# Patient Record
Sex: FEMALE | Race: WHITE | NOT HISPANIC OR LATINO | Employment: FULL TIME | URBAN - METROPOLITAN AREA
[De-identification: names, ages, dates, MRNs, and addresses within clinical notes are randomized per-mention and may not be internally consistent; named-entity substitution may affect disease eponyms.]

---

## 2019-05-01 ENCOUNTER — OFFICE VISIT (OUTPATIENT)
Dept: ENDOCRINOLOGY | Facility: CLINIC | Age: 19
End: 2019-05-01

## 2019-05-01 VITALS
DIASTOLIC BLOOD PRESSURE: 78 MMHG | WEIGHT: 166 LBS | SYSTOLIC BLOOD PRESSURE: 132 MMHG | HEART RATE: 117 BPM | OXYGEN SATURATION: 99 %

## 2019-05-01 DIAGNOSIS — Z30.41 ENCOUNTER FOR SURVEILLANCE OF CONTRACEPTIVE PILLS: ICD-10-CM

## 2019-05-01 DIAGNOSIS — Z46.81 INSULIN PUMP TITRATION: ICD-10-CM

## 2019-05-01 DIAGNOSIS — R00.0 TACHYCARDIA: ICD-10-CM

## 2019-05-01 DIAGNOSIS — E10.65 UNCONTROLLED TYPE 1 DIABETES MELLITUS WITH HYPERGLYCEMIA (HCC): Primary | ICD-10-CM

## 2019-05-01 DIAGNOSIS — E11.649 DIABETIC HYPOGLYCEMIA (HCC): ICD-10-CM

## 2019-05-01 PROBLEM — E10.9 TYPE 1 DIABETES MELLITUS WITHOUT COMPLICATION: Status: ACTIVE | Noted: 2019-05-01

## 2019-05-01 LAB
ALBUMIN SERPL-MCNC: 4 G/DL (ref 3.5–5.2)
ALBUMIN/GLOB SERPL: 1 G/DL
ALP SERPL-CCNC: 95 U/L (ref 39–117)
ALT SERPL W P-5'-P-CCNC: 13 U/L (ref 1–33)
ANION GAP SERPL CALCULATED.3IONS-SCNC: 13.6 MMOL/L
ARTICHOKE IGE QN: 103 MG/DL (ref 0–100)
AST SERPL-CCNC: 37 U/L (ref 1–32)
BASOPHILS # BLD AUTO: 0.07 10*3/MM3 (ref 0–0.2)
BASOPHILS NFR BLD AUTO: 0.7 % (ref 0–1.5)
BILIRUB SERPL-MCNC: 0.5 MG/DL (ref 0.2–1.2)
BUN BLD-MCNC: 11 MG/DL (ref 6–20)
BUN/CREAT SERPL: 13.4 (ref 7–25)
CALCIUM SPEC-SCNC: 9.8 MG/DL (ref 8.6–10.5)
CHLORIDE SERPL-SCNC: 95 MMOL/L (ref 98–107)
CHOLEST SERPL-MCNC: 189 MG/DL (ref 0–200)
CO2 SERPL-SCNC: 27.4 MMOL/L (ref 22–29)
CREAT BLD-MCNC: 0.82 MG/DL (ref 0.57–1)
DEPRECATED RDW RBC AUTO: 56.5 FL (ref 37–54)
EOSINOPHIL # BLD AUTO: 0.39 10*3/MM3 (ref 0–0.4)
EOSINOPHIL NFR BLD AUTO: 4 % (ref 0.3–6.2)
ERYTHROCYTE [DISTWIDTH] IN BLOOD BY AUTOMATED COUNT: 19.8 % (ref 12.3–15.4)
GFR SERPL CREATININE-BSD FRML MDRD: 89 ML/MIN/1.73
GLOBULIN UR ELPH-MCNC: 3.9 GM/DL
GLUCOSE BLD-MCNC: 268 MG/DL (ref 65–99)
GLUCOSE BLDC GLUCOMTR-MCNC: 305 MG/DL (ref 70–130)
HBA1C MFR BLD: 8.2 %
HCT VFR BLD AUTO: 43.2 % (ref 34–46.6)
HDLC SERPL-MCNC: 60 MG/DL (ref 40–60)
HGB BLD-MCNC: 12.8 G/DL (ref 12–15.9)
IMM GRANULOCYTES # BLD AUTO: 0.03 10*3/MM3 (ref 0–0.05)
IMM GRANULOCYTES NFR BLD AUTO: 0.3 % (ref 0–0.5)
LDLC SERPL CALC-MCNC: ABNORMAL MG/DL (ref 0–100)
LDLC/HDLC SERPL: ABNORMAL {RATIO}
LYMPHOCYTES # BLD AUTO: 2.97 10*3/MM3 (ref 0.7–3.1)
LYMPHOCYTES NFR BLD AUTO: 30.3 % (ref 19.6–45.3)
MCH RBC QN AUTO: 23.7 PG (ref 26.6–33)
MCHC RBC AUTO-ENTMCNC: 29.6 G/DL (ref 31.5–35.7)
MCV RBC AUTO: 80.1 FL (ref 79–97)
MONOCYTES # BLD AUTO: 0.59 10*3/MM3 (ref 0.1–0.9)
MONOCYTES NFR BLD AUTO: 6 % (ref 5–12)
NEUTROPHILS # BLD AUTO: 5.75 10*3/MM3 (ref 1.7–7)
NEUTROPHILS NFR BLD AUTO: 58.7 % (ref 42.7–76)
NRBC BLD AUTO-RTO: 0 /100 WBC (ref 0–0.2)
PLATELET # BLD AUTO: 461 10*3/MM3 (ref 140–450)
PMV BLD AUTO: 10.8 FL (ref 6–12)
POTASSIUM BLD-SCNC: 5 MMOL/L (ref 3.5–5.2)
PROT SERPL-MCNC: 7.9 G/DL (ref 6–8.5)
RBC # BLD AUTO: 5.39 10*6/MM3 (ref 3.77–5.28)
SODIUM BLD-SCNC: 136 MMOL/L (ref 136–145)
T4 FREE SERPL-MCNC: 0.85 NG/DL (ref 0.93–1.7)
TRIGL SERPL-MCNC: 494 MG/DL (ref 0–150)
TSH SERPL DL<=0.05 MIU/L-ACNC: 1.54 MIU/ML (ref 0.27–4.2)
VLDLC SERPL-MCNC: ABNORMAL MG/DL (ref 5–40)
WBC NRBC COR # BLD: 9.8 10*3/MM3 (ref 3.4–10.8)

## 2019-05-01 PROCEDURE — 80061 LIPID PANEL: CPT | Performed by: PHYSICIAN ASSISTANT

## 2019-05-01 PROCEDURE — 85025 COMPLETE CBC W/AUTO DIFF WBC: CPT | Performed by: PHYSICIAN ASSISTANT

## 2019-05-01 PROCEDURE — 83721 ASSAY OF BLOOD LIPOPROTEIN: CPT | Performed by: PHYSICIAN ASSISTANT

## 2019-05-01 PROCEDURE — 83036 HEMOGLOBIN GLYCOSYLATED A1C: CPT | Performed by: PHYSICIAN ASSISTANT

## 2019-05-01 PROCEDURE — 80053 COMPREHEN METABOLIC PANEL: CPT | Performed by: PHYSICIAN ASSISTANT

## 2019-05-01 PROCEDURE — 84439 ASSAY OF FREE THYROXINE: CPT | Performed by: PHYSICIAN ASSISTANT

## 2019-05-01 PROCEDURE — 99204 OFFICE O/P NEW MOD 45 MIN: CPT | Performed by: PHYSICIAN ASSISTANT

## 2019-05-01 PROCEDURE — 82043 UR ALBUMIN QUANTITATIVE: CPT | Performed by: PHYSICIAN ASSISTANT

## 2019-05-01 PROCEDURE — 82947 ASSAY GLUCOSE BLOOD QUANT: CPT | Performed by: PHYSICIAN ASSISTANT

## 2019-05-01 PROCEDURE — 84443 ASSAY THYROID STIM HORMONE: CPT | Performed by: PHYSICIAN ASSISTANT

## 2019-05-01 PROCEDURE — 82570 ASSAY OF URINE CREATININE: CPT | Performed by: PHYSICIAN ASSISTANT

## 2019-05-01 NOTE — PROGRESS NOTES
Chief Complaint  Establish care for Diabetes Mellitus.    GIOVANNY   Gina Laws is a 18 y.o. female who is here today for evaluation of Diabetes Mellitus type 1. The initial diagnosis of diabetes was made at age 8.    Self referral. No labs available for review.  Not interested in CGM, doesn't like things attached to her.   Requesting birth control sprintec to be refilled. Hasn't taken in few months, ran out few months ago.  Reports elevated HR in the past year. Drinks caffeine. Hydrates well. Not associated with hypoglycemia.   Dx with Celiac Dz at age 12.   A1C- (5/1/19)    Diabetic complications: none  Eye exam current (within one year): utd in [place] . No retinopathy per pt.   Foot care and dental care: discussed    Current diabetic medications include:  Novolog, medtronic insulin pump    Statin: no    Past medications: lantus    Diabetic Monitoring  -   Pump downloaded and reviewed- checking BS 3-4x/day but only putting high sugar into the pump, hypoglycemia in AM, hyperglycemia in the afternoon, suspending pump when exercising resulting in BS in the 300s    Nutrition:     Current diet: counting carbs  Current exercise: running/ jogging  Seen RD in past: yes    The following portions of the patient's history were reviewed and updated by me as appropriate: allergies, current medications, past family history, past social history, past surgical history and problem list.    History reviewed. No pertinent past medical history.    Medications    Current Outpatient Medications:   •  NOVOLOG 100 UNIT/ML injection, Use up to 100u daily via insulin pump, Disp: 30 mL, Rfl: 6  •  SPRINTEC 28 0.25-35 MG-MCG per tablet, Take 1 tablet by mouth Daily., Disp: 28 tablet, Rfl: 6    Review of Systems  Review of Systems   Constitutional: Negative for chills, fatigue, fever and unexpected weight change.   HENT: Negative for ear pain, hearing loss, nosebleeds, rhinorrhea and sore throat.    Eyes: Negative for pain, discharge,  redness, itching and visual disturbance.   Respiratory: Negative for cough, shortness of breath and wheezing.    Cardiovascular: Negative for chest pain, palpitations and leg swelling.        Elevated HR   Gastrointestinal: Negative for abdominal pain, blood in stool, constipation and diarrhea.   Endocrine: Negative for cold intolerance, heat intolerance and polydipsia.   Genitourinary: Negative for dysuria, frequency, hematuria, pelvic pain, vaginal bleeding and vaginal discharge.   Musculoskeletal: Negative for arthralgias, gait problem, joint swelling and myalgias.   Skin: Negative for rash.   Allergic/Immunologic: Negative.    Neurological: Negative for dizziness, syncope, weakness, numbness and headaches.   Hematological: Negative for adenopathy. Does not bruise/bleed easily.   Psychiatric/Behavioral: Negative for sleep disturbance and suicidal ideas. The patient is not nervous/anxious.         Physical Exam    /78   Pulse 117   Wt 75.3 kg (166 lb)   SpO2 99% There is no height or weight on file to calculate BMI.  Physical Exam   Constitutional: She is oriented to person, place, and time. She appears well-developed. No distress.   HENT:   Head: Normocephalic.   Right Ear: External ear normal.   Left Ear: External ear normal.   Mouth/Throat: No oropharyngeal exudate.   Eyes: Conjunctivae and lids are normal. Right eye exhibits no discharge. Left eye exhibits no discharge. Right pupil is reactive. Left pupil is reactive.   Neck: No JVD present. No tracheal deviation present. No thyroid mass and no thyromegaly present.   Cardiovascular: Normal rate, regular rhythm, normal heart sounds and intact distal pulses.   No murmur heard.  Pulmonary/Chest: Effort normal and breath sounds normal. No respiratory distress. She has no wheezes.   Abdominal: Soft. Bowel sounds are normal. There is no tenderness.   Musculoskeletal: She exhibits no edema or tenderness.    Gina had a diabetic foot exam performed today.    During the foot exam she had a monofilament test performed.    Neurological Sensory Findings -  Unaltered sharp/dull right ankle/foot discrimination and unaltered sharp/dull left ankle/foot discrimination.  Vascular Status -  Her right foot exhibits normal foot vasculature  and no edema. Her left foot exhibits normal foot vasculature  and no edema.  Skin Integrity  -  Her right foot skin is intact.  She has no right foot onychomycosis, no right foot ulcer and non-callous right foot.Her left foot skin is intact. She has no left foot onychomycosis, no left foot ulcer and non-callous left foot..  Lymphadenopathy:     She has no cervical adenopathy.   Neurological: She is alert and oriented to person, place, and time.   Skin: Skin is warm, dry and intact. No rash noted. She is not diaphoretic. No erythema.   Psychiatric: She has a normal mood and affect. Her speech is normal and behavior is normal. Thought content normal.       Labs and Imaging   Lab Results   Component Value Date    HGBA1C 8.2 05/01/2019     Office Visit on 05/01/2019   Component Date Value Ref Range Status   • Glucose 05/01/2019 305* 70 - 130 mg/dL Final   • Hemoglobin A1C 05/01/2019 8.2  % Final     No images are attached to the encounter or orders placed in the encounter.  No Images in the past 120 days found..    Assessment / Plan   Gina was seen today for establish care.    Diagnoses and all orders for this visit:    Uncontrolled type 1 diabetes mellitus with hyperglycemia (CMS/[facility])  -     POC Glucose Fingerstick  -     POC Glycosylated Hemoglobin (Hb A1C)  -     NOVOLOG 100 UNIT/ML injection; Use up to 100u daily via insulin pump  -     Lipid Panel  -     Comprehensive Metabolic Panel  -     Microalbumin / Creatinine Urine Ratio - Urine, Clean Catch    Encounter for surveillance of contraceptive pills  -     SPRINTEC 28 0.25-35 MG-MCG per tablet; Take 1 tablet by mouth Daily.    Tachycardia  -     TSH  -     T4, Free  -     CBC &  Differential  -     CBC Auto Differential    Insulin pump titration    Diabetic hypoglycemia (CMS/HCC)        Diabetes Mellitus 1 is under inadequate control.  -A1c 8.2  -pump downloaded and reviewed- see summary above  -settings adjusted- decreased basal rate 1133-1021, decrease carb ratio and sensitivity starting at 1000, increased target blood sugar from 100 to 120  -discussed keeping pump on while exercising and setting temp basal rate at 50% while exercising  -discussed putting all bs readings in to the pump  -reviewed benefits of CGM, especially basal IQ with use of Dexcom, but pt does not want anything else attached to her    1.  Diet: 3-4 carb servings per meal for females, 4-5 carb servings per meal for males  Spread carb intake throughout the day  Increase lean protein and vegetable intake  Avoid sugary drinks and processed carbs including crackers, cookies, cakes  2.  Exercise: Recommend at least 30 minutes of exercise daily, at least 5 days per week. Increase exercise gradually.   3.  Blood Glucose Goal: Blood glucose goal <150 fasting, <180 2 hr postprandial  4.  Microalbumin due  5.  Education performed during this visit: long term diabetic complications discussed. , annual eye examinations at Ophthalmology discussed, dental hygiene discussed  and foot care reviewed., home glucose monitoring emphasized, all medications, side effects and compliance discussed carefully and Hypoglycemia management and prevention reviewed. Reviewed ‘ABCs’ of diabetes management (respective goals in parentheses):  A1C (<7), blood pressure (<130/80), and cholesterol (LDL <100, if CVD <70).    There are no Patient Instructions on file for this visit.    Follow up: Return in about 3 months (around 8/1/2019).    Discussed the nature of the disease including, risks, complications, implications, management, safe and proper use of medications. Encouraged therapeutic lifestyle changes including low calorie diet with plenty of  fruits and vegetables, daily exercise, medication compliance, and keeping scheduled follow up appointments with me and any other providers. Encouraged patient to have appointment for complete physical, fasting labs, appropriate screenings, and immunizations on an annual basis.    25 min  of 45 min face-to-face visit time spent for coordination of care and counselling regarding identified problems as outlined in the objective, assessment and discussion portions of the documentation.    Tiffanie Rivero PA-C

## 2019-05-02 LAB
ALBUMIN UR-MCNC: 1.8 MG/L
CREAT UR-MCNC: 92.7 MG/DL
MICROALBUMIN/CREAT UR: 19.4 MG/G

## 2019-09-03 ENCOUNTER — OFFICE VISIT (OUTPATIENT)
Dept: ENDOCRINOLOGY | Facility: CLINIC | Age: 19
End: 2019-09-03

## 2019-09-03 VITALS
DIASTOLIC BLOOD PRESSURE: 80 MMHG | WEIGHT: 159 LBS | SYSTOLIC BLOOD PRESSURE: 120 MMHG | OXYGEN SATURATION: 99 % | HEART RATE: 83 BPM

## 2019-09-03 DIAGNOSIS — R79.89 ELEVATED PLATELET COUNT: ICD-10-CM

## 2019-09-03 DIAGNOSIS — E10.9 TYPE 1 DIABETES MELLITUS WITHOUT COMPLICATION (HCC): Primary | ICD-10-CM

## 2019-09-03 DIAGNOSIS — Z46.81 INSULIN PUMP TITRATION: ICD-10-CM

## 2019-09-03 DIAGNOSIS — R94.6 ABNORMAL THYROID FUNCTION TEST: ICD-10-CM

## 2019-09-03 LAB
BASOPHILS # BLD AUTO: 0.09 10*3/MM3 (ref 0–0.2)
BASOPHILS NFR BLD AUTO: 1.3 % (ref 0–1.5)
DEPRECATED RDW RBC AUTO: 53.8 FL (ref 37–54)
EOSINOPHIL # BLD AUTO: 0.15 10*3/MM3 (ref 0–0.4)
EOSINOPHIL NFR BLD AUTO: 2.1 % (ref 0.3–6.2)
ERYTHROCYTE [DISTWIDTH] IN BLOOD BY AUTOMATED COUNT: 17.2 % (ref 12.3–15.4)
GLUCOSE BLDC GLUCOMTR-MCNC: 166 MG/DL (ref 70–130)
HBA1C MFR BLD: 7.7 %
HCT VFR BLD AUTO: 42 % (ref 34–46.6)
HGB BLD-MCNC: 12.4 G/DL (ref 12–15.9)
IMM GRANULOCYTES # BLD AUTO: 0.02 10*3/MM3 (ref 0–0.05)
IMM GRANULOCYTES NFR BLD AUTO: 0.3 % (ref 0–0.5)
LYMPHOCYTES # BLD AUTO: 2.64 10*3/MM3 (ref 0.7–3.1)
LYMPHOCYTES NFR BLD AUTO: 37.6 % (ref 19.6–45.3)
MCH RBC QN AUTO: 25.2 PG (ref 26.6–33)
MCHC RBC AUTO-ENTMCNC: 29.5 G/DL (ref 31.5–35.7)
MCV RBC AUTO: 85.2 FL (ref 79–97)
MONOCYTES # BLD AUTO: 0.44 10*3/MM3 (ref 0.1–0.9)
MONOCYTES NFR BLD AUTO: 6.3 % (ref 5–12)
NEUTROPHILS # BLD AUTO: 3.69 10*3/MM3 (ref 1.7–7)
NEUTROPHILS NFR BLD AUTO: 52.4 % (ref 42.7–76)
NRBC BLD AUTO-RTO: 0 /100 WBC (ref 0–0.2)
PLATELET # BLD AUTO: 339 10*3/MM3 (ref 140–450)
PMV BLD AUTO: 10.6 FL (ref 6–12)
RBC # BLD AUTO: 4.93 10*6/MM3 (ref 3.77–5.28)
T4 FREE SERPL-MCNC: 0.94 NG/DL (ref 0.93–1.7)
TSH SERPL DL<=0.05 MIU/L-ACNC: 1.73 UIU/ML (ref 0.27–4.2)
WBC NRBC COR # BLD: 7.03 10*3/MM3 (ref 3.4–10.8)

## 2019-09-03 PROCEDURE — 84443 ASSAY THYROID STIM HORMONE: CPT | Performed by: PHYSICIAN ASSISTANT

## 2019-09-03 PROCEDURE — 99215 OFFICE O/P EST HI 40 MIN: CPT | Performed by: PHYSICIAN ASSISTANT

## 2019-09-03 PROCEDURE — 83036 HEMOGLOBIN GLYCOSYLATED A1C: CPT | Performed by: PHYSICIAN ASSISTANT

## 2019-09-03 PROCEDURE — 82947 ASSAY GLUCOSE BLOOD QUANT: CPT | Performed by: PHYSICIAN ASSISTANT

## 2019-09-03 PROCEDURE — 85025 COMPLETE CBC W/AUTO DIFF WBC: CPT | Performed by: PHYSICIAN ASSISTANT

## 2019-09-03 PROCEDURE — 84439 ASSAY OF FREE THYROXINE: CPT | Performed by: PHYSICIAN ASSISTANT

## 2019-09-03 RX ORDER — BLOOD-GLUCOSE METER
EACH MISCELLANEOUS
Qty: 1 KIT | Refills: 0 | Status: SHIPPED | OUTPATIENT
Start: 2019-09-03

## 2019-09-03 RX ORDER — BLOOD-GLUCOSE METER
EACH MISCELLANEOUS
Qty: 1 KIT | Refills: 0 | Status: SHIPPED | OUTPATIENT
Start: 2019-09-03 | End: 2019-09-03 | Stop reason: CLARIF

## 2019-09-03 NOTE — PROGRESS NOTES
Chief Complaint  Establish care for Diabetes Mellitus.    GIOVANNY   Gina Laws is a 18 y.o. female who is here today for evaluation of Diabetes Mellitus type 1. The initial diagnosis of diabetes was made at age 8.    Sometimes suspends pump when exercising.   Due to fear of lows adding lower carb counts to pump.     A1C- 7.7 (9/3/19), 8.2 (5/1/19)    Dx with Celiac Dz at age 12.    Diabetic complications: none  Eye exam current (within one year): utd in [place] . No retinopathy per pt.   Foot care and dental care: discussed    Current diabetic medications include:  Novolog, Tandem insulin pump    Statin: no    Past medications: lantus    Diabetic Monitoring  -   meter downloaded and reviewed- checking BS 3-6x/day, large variability in bs with numbers ranging from the 40s to 400s  Unable to download tandem pump due to technical difficulties    Not interested in CGM, doesn't like things attached to her.     Nutrition:     Current diet: counting carbs  Current exercise: running/ jogging  Seen RD in past: yes    The following portions of the patient's history were reviewed and updated by me as appropriate: allergies, current medications, past family history, past social history, past surgical history and problem list.    History reviewed. No pertinent past medical history.    Medications    Current Outpatient Medications:   •  NOVOLOG 100 UNIT/ML injection, Use up to 100u daily via insulin pump, Disp: 30 mL, Rfl: 6  •  SPRINTEC 28 0.25-35 MG-MCG per tablet, Take 1 tablet by mouth Daily., Disp: 28 tablet, Rfl: 6  •  Blood Glucose Monitoring Suppl (ONETOUCH VERIO IQ SYSTEM) w/Device kit, Use as directed, Disp: 1 kit, Rfl: 0  •  glucose blood (ONETOUCH VERIO) test strip, Test bs 4-8x/day, Disp: 750 each, Rfl: 3  •  ONE TOUCH LANCETS misc, Test bs 4-8x/day, Disp: 750 each, Rfl: 3    Review of Systems  Review of Systems   Constitutional: Negative for chills, fatigue, fever and unexpected weight change.   HENT:  Negative for ear pain, hearing loss, nosebleeds, rhinorrhea and sore throat.    Eyes: Negative for pain, discharge, redness, itching and visual disturbance.   Respiratory: Negative for cough, shortness of breath and wheezing.    Cardiovascular: Negative for chest pain, palpitations and leg swelling.        Elevated HR   Gastrointestinal: Negative for abdominal pain, blood in stool, constipation and diarrhea.   Endocrine: Negative for cold intolerance, heat intolerance and polydipsia.   Genitourinary: Negative for dysuria, frequency, hematuria, pelvic pain, vaginal bleeding and vaginal discharge.   Musculoskeletal: Negative for arthralgias, gait problem, joint swelling and myalgias.   Skin: Negative for rash.   Allergic/Immunologic: Negative.    Neurological: Negative for dizziness, syncope, weakness, numbness and headaches.   Hematological: Negative for adenopathy. Does not bruise/bleed easily.   Psychiatric/Behavioral: Negative for sleep disturbance and suicidal ideas. The patient is not nervous/anxious.         Physical Exam    /80   Pulse 83   Wt 72.1 kg (159 lb)   SpO2 99% There is no height or weight on file to calculate BMI.  Physical Exam   Constitutional: She is oriented to person, place, and time. She appears well-developed. No distress.   HENT:   Head: Normocephalic.   Right Ear: External ear normal.   Left Ear: External ear normal.   Mouth/Throat: No oropharyngeal exudate.   Eyes: Conjunctivae and lids are normal. Right eye exhibits no discharge. Left eye exhibits no discharge. Right pupil is reactive. Left pupil is reactive.   Neck: No JVD present. No tracheal deviation present. No thyroid mass and no thyromegaly present.   Cardiovascular: Normal rate, regular rhythm, normal heart sounds and intact distal pulses.   No murmur heard.  Pulmonary/Chest: Effort normal and breath sounds normal. No respiratory distress. She has no wheezes.   Abdominal: Soft. Bowel sounds are normal. There is no  tenderness.   Musculoskeletal: She exhibits no edema or tenderness.     Vascular Status -  Her right foot exhibits no edema. Her left foot exhibits no edema.  Skin Integrity  -  Her right foot skin is intact.  She has no right foot onychomycosis, no right foot ulcer and non-callous right foot.Her left foot skin is intact. She has no left foot onychomycosis, no left foot ulcer and non-callous left foot..  Lymphadenopathy:     She has no cervical adenopathy.   Neurological: She is alert and oriented to person, place, and time.   Skin: Skin is warm, dry and intact. No rash noted. She is not diaphoretic. No erythema.   Psychiatric: She has a normal mood and affect. Her speech is normal and behavior is normal. Thought content normal.       Labs and Imaging   Lab Results   Component Value Date    HGBA1C 8.2 05/01/2019     No visits with results within 1 Month(s) from this visit.   Latest known visit with results is:   Office Visit on 05/01/2019   Component Date Value Ref Range Status   • Glucose 05/01/2019 305* 70 - 130 mg/dL Final   • Hemoglobin A1C 05/01/2019 8.2  % Final   • TSH 05/01/2019 1.540  0.270 - 4.200 mIU/mL Final   • Free T4 05/01/2019 0.85* 0.93 - 1.70 ng/dL Final   • Total Cholesterol 05/01/2019 189  0 - 200 mg/dL Final   • Triglycerides 05/01/2019 494* 0 - 150 mg/dL Final   • HDL Cholesterol 05/01/2019 60  40 - 60 mg/dL Final   • LDL Cholesterol  05/01/2019   0 - 100 mg/dL Final    Unable to calculate   • VLDL Cholesterol 05/01/2019   5 - 40 mg/dL Final    Unable to calculate   • LDL/HDL Ratio 05/01/2019    Final    Unable to calculate   • Glucose 05/01/2019 268* 65 - 99 mg/dL Final   • BUN 05/01/2019 11  6 - 20 mg/dL Final   • Creatinine 05/01/2019 0.82  0.57 - 1.00 mg/dL Final   • Sodium 05/01/2019 136  136 - 145 mmol/L Final   • Potassium 05/01/2019 5.0  3.5 - 5.2 mmol/L Final   • Chloride 05/01/2019 95* 98 - 107 mmol/L Final   • CO2 05/01/2019 27.4  22.0 - 29.0 mmol/L Final   • Calcium 05/01/2019 9.8   8.6 - 10.5 mg/dL Final   • Total Protein 05/01/2019 7.9  6.0 - 8.5 g/dL Final   • Albumin 05/01/2019 4.00  3.50 - 5.20 g/dL Final   • ALT (SGPT) 05/01/2019 13  1 - 33 U/L Final   • AST (SGOT) 05/01/2019 37* 1 - 32 U/L Final   • Alkaline Phosphatase 05/01/2019 95  39 - 117 U/L Final   • Total Bilirubin 05/01/2019 0.5  0.2 - 1.2 mg/dL Final   • eGFR Non African Amer 05/01/2019 89  >60 mL/min/1.73 Final   • Globulin 05/01/2019 3.9  gm/dL Final   • A/G Ratio 05/01/2019 1.0  g/dL Final   • BUN/Creatinine Ratio 05/01/2019 13.4  7.0 - 25.0 Final   • Anion Gap 05/01/2019 13.6  mmol/L Final   • Microalbumin/Creatinine Ratio 05/01/2019 19.4  mg/g Final   • Creatinine, Urine 05/01/2019 92.7  mg/dL Final   • Microalbumin, Urine 05/01/2019 1.8  mg/L Final   • WBC 05/01/2019 9.80  3.40 - 10.80 10*3/mm3 Final   • RBC 05/01/2019 5.39* 3.77 - 5.28 10*6/mm3 Final   • Hemoglobin 05/01/2019 12.8  12.0 - 15.9 g/dL Final   • Hematocrit 05/01/2019 43.2  34.0 - 46.6 % Final   • MCV 05/01/2019 80.1  79.0 - 97.0 fL Final   • MCH 05/01/2019 23.7* 26.6 - 33.0 pg Final   • MCHC 05/01/2019 29.6* 31.5 - 35.7 g/dL Final   • RDW 05/01/2019 19.8* 12.3 - 15.4 % Final   • RDW-SD 05/01/2019 56.5* 37.0 - 54.0 fl Final   • MPV 05/01/2019 10.8  6.0 - 12.0 fL Final   • Platelets 05/01/2019 461* 140 - 450 10*3/mm3 Final   • Neutrophil % 05/01/2019 58.7  42.7 - 76.0 % Final   • Lymphocyte % 05/01/2019 30.3  19.6 - 45.3 % Final   • Monocyte % 05/01/2019 6.0  5.0 - 12.0 % Final   • Eosinophil % 05/01/2019 4.0  0.3 - 6.2 % Final   • Basophil % 05/01/2019 0.7  0.0 - 1.5 % Final   • Immature Grans % 05/01/2019 0.3  0.0 - 0.5 % Final   • Neutrophils, Absolute 05/01/2019 5.75  1.70 - 7.00 10*3/mm3 Final   • Lymphocytes, Absolute 05/01/2019 2.97  0.70 - 3.10 10*3/mm3 Final   • Monocytes, Absolute 05/01/2019 0.59  0.10 - 0.90 10*3/mm3 Final   • Eosinophils, Absolute 05/01/2019 0.39  0.00 - 0.40 10*3/mm3 Final   • Basophils, Absolute 05/01/2019 0.07  0.00 - 0.20  10*3/mm3 Final   • Immature Grans, Absolute 05/01/2019 0.03  0.00 - 0.05 10*3/mm3 Final   • nRBC 05/01/2019 0.0  0.0 - 0.2 /100 WBC Final   • LDL Cholesterol  05/01/2019 103* 0 - 100 mg/dL Final     No images are attached to the encounter or orders placed in the encounter.  No Images in the past 120 days found..    Assessment / Plan   Gina was seen today for follow-up.    Diagnoses and all orders for this visit:    Type 1 diabetes mellitus without complication (CMS/HCC)  -     POC Glucose Fingerstick  -     POC Glycosylated Hemoglobin (Hb A1C)  -     Blood Glucose Monitoring Suppl (ONETOUCH VERIO IQ SYSTEM) w/Device kit; Use as directed  -     glucose blood (ONETOUCH VERIO) test strip; Test bs 4-8x/day  -     ONE TOUCH LANCETS misc; Test bs 4-8x/day    Insulin pump titration    Abnormal thyroid function test  -     TSH  -     T4, Free    Elevated platelet count  -     CBC & Differential  -     CBC Auto Differential        Diabetes Mellitus 1 is under inadequate control.  -A1c 7.7, down from 8.2 5/2019  -unable to download pump due to technical difficulties  -meter downloaded and reviewed- see summary above  -settings adjusted- increase sensitivity at 0600 from 1:20 to 1:25, increased target blood sugar from 120 to 150  -discussed keeping pump on while exercising and setting temp basal rate at 50% while exercising  -discussed putting all bs readings and accurate carbs into the pump    1.  Diet: 3-4 carb servings per meal for females, 4-5 carb servings per meal for males  Spread carb intake throughout the day  Increase lean protein and vegetable intake  Avoid sugary drinks and processed carbs including crackers, cookies, cakes  2.  Exercise: Recommend at least 30 minutes of exercise daily, at least 5 days per week. Increase exercise gradually.   3.  Blood Glucose Goal: Blood glucose goal <150 fasting, <180 2 hr postprandial  4.  Microalbumin due 5/2020  5.  Education performed during this visit: long term diabetic  complications discussed. , annual eye examinations at Ophthalmology discussed, dental hygiene discussed  and foot care reviewed., home glucose monitoring emphasized, all medications, side effects and compliance discussed carefully and Hypoglycemia management and prevention reviewed. Reviewed ‘ABCs’ of diabetes management (respective goals in parentheses):  A1C (<7), blood pressure (<130/80), and cholesterol (LDL <100, if CVD <70).    Abnormal thyroid function test  -normal TSH with slightly low FT4  -repeat today    Elevated platelets  -repeat today    There are no Patient Instructions on file for this visit.    Follow up: Return in about 3 months (around 12/3/2019).    Discussed the nature of the disease including, risks, complications, implications, management, safe and proper use of medications. Encouraged therapeutic lifestyle changes including low calorie diet with plenty of fruits and vegetables, daily exercise, medication compliance, and keeping scheduled follow up appointments with me and any other providers. Encouraged patient to have appointment for complete physical, fasting labs, appropriate screenings, and immunizations on an annual basis.      Tiffanie Rivero PA-C    25 min  of 40 min face-to-face visit time spent for coordination of care and counselling regarding identified problems as outlined in the objective, assessment and discussion portions of the documentation.

## 2019-09-23 ENCOUNTER — TELEPHONE (OUTPATIENT)
Dept: ENDOCRINOLOGY | Facility: CLINIC | Age: 19
End: 2019-09-23

## 2019-09-23 NOTE — TELEPHONE ENCOUNTER
JEANETH IS CALLING FROM [facility] TO SEE IF DEBI RECEIVED PUMP SUPPLIES ORDER THAT THEY RESENT FOR A SIGNATURE ON 9/18/19. [facility] [employer] PHONE [phone]

## 2019-12-09 ENCOUNTER — OFFICE VISIT (OUTPATIENT)
Dept: ENDOCRINOLOGY | Facility: CLINIC | Age: 19
End: 2019-12-09

## 2019-12-09 VITALS
OXYGEN SATURATION: 100 % | DIASTOLIC BLOOD PRESSURE: 80 MMHG | HEART RATE: 89 BPM | WEIGHT: 165 LBS | SYSTOLIC BLOOD PRESSURE: 122 MMHG

## 2019-12-09 DIAGNOSIS — E11.649 DIABETIC HYPOGLYCEMIA (HCC): ICD-10-CM

## 2019-12-09 DIAGNOSIS — E10.65 UNCONTROLLED TYPE 1 DIABETES MELLITUS WITH HYPERGLYCEMIA (HCC): ICD-10-CM

## 2019-12-09 DIAGNOSIS — Z46.81 INSULIN PUMP TITRATION: ICD-10-CM

## 2019-12-09 DIAGNOSIS — E10.65 UNCONTROLLED TYPE 1 DIABETES MELLITUS WITH HYPERGLYCEMIA (HCC): Primary | ICD-10-CM

## 2019-12-09 LAB
GLUCOSE BLDC GLUCOMTR-MCNC: 144 MG/DL (ref 70–130)
HBA1C MFR BLD: 8.1 %

## 2019-12-09 PROCEDURE — 99214 OFFICE O/P EST MOD 30 MIN: CPT | Performed by: PHYSICIAN ASSISTANT

## 2019-12-09 PROCEDURE — 83036 HEMOGLOBIN GLYCOSYLATED A1C: CPT | Performed by: PHYSICIAN ASSISTANT

## 2019-12-09 PROCEDURE — 82947 ASSAY GLUCOSE BLOOD QUANT: CPT | Performed by: PHYSICIAN ASSISTANT

## 2019-12-09 RX ORDER — PROCHLORPERAZINE 25 MG/1
SUPPOSITORY RECTAL
Qty: 3 EACH | Refills: 11 | Status: SHIPPED | OUTPATIENT
Start: 2019-12-09 | End: 2020-06-22 | Stop reason: ALTCHOICE

## 2019-12-09 RX ORDER — PROCHLORPERAZINE 25 MG/1
1 SUPPOSITORY RECTAL
Qty: 1 EACH | Refills: 3 | Status: SHIPPED | OUTPATIENT
Start: 2019-12-09 | End: 2019-12-09 | Stop reason: SDUPTHER

## 2019-12-09 RX ORDER — PROCHLORPERAZINE 25 MG/1
1 SUPPOSITORY RECTAL CONTINUOUS
Qty: 1 DEVICE | Refills: 0 | Status: SHIPPED | OUTPATIENT
Start: 2019-12-09 | End: 2020-06-22 | Stop reason: ALTCHOICE

## 2019-12-09 RX ORDER — PROCHLORPERAZINE 25 MG/1
1 SUPPOSITORY RECTAL
Qty: 1 EACH | Refills: 3 | Status: SHIPPED | OUTPATIENT
Start: 2019-12-09 | End: 2020-06-22 | Stop reason: ALTCHOICE

## 2019-12-09 RX ORDER — PROCHLORPERAZINE 25 MG/1
1 SUPPOSITORY RECTAL CONTINUOUS
Qty: 1 DEVICE | Refills: 0 | Status: SHIPPED | OUTPATIENT
Start: 2019-12-09 | End: 2019-12-09 | Stop reason: SDUPTHER

## 2019-12-09 RX ORDER — PROCHLORPERAZINE 25 MG/1
SUPPOSITORY RECTAL
Qty: 3 EACH | Refills: 11 | Status: SHIPPED | OUTPATIENT
Start: 2019-12-09 | End: 2019-12-09 | Stop reason: SDUPTHER

## 2019-12-09 NOTE — PROGRESS NOTES
Chief Complaint  f/u for Diabetes Mellitus.    HPI   Gina Laws is a 19 y.o. female with celiac dz who is here today for f/u of Diabetes Mellitus type 1. The initial diagnosis of diabetes was made at age 8.     A1C- 8.1 (12/9/19), 7.7 (9/3/19), 8.2 (5/1/19)      Diabetic complications: none  Eye exam current (within one year): utd in [place] . No retinopathy per pt.   Foot care and dental care: discussed    Current diabetic medications include:  Novolog, Tandem insulin pump    Statin: no    Past medications: lantus    Diabetic Monitoring  -   Tandem pump downloaded and reviewed- 65% basal, 32% bolus, not always adding bs readings and carb to pump, frequent manual boluses  Meter downloaded and reviewed- bs randing from 40-400s, checking sugar 3-4x/day    Not interested in CGM, doesn't like things attached to her.     Nutrition:     Current diet: counting carbs  Current exercise: running/ jogging  Seen RD in past: yes    The following portions of the patient's history were reviewed and updated by me as appropriate: allergies, current medications, past family history, past social history, past surgical history and problem list.      History reviewed. No pertinent past medical history.    Medications    Current Outpatient Medications:   •  Blood Glucose Monitoring Suppl (ACCU-CHEK LÁZARO PLUS) w/Device kit, Use device to check blood sugar daily, Disp: 1 kit, Rfl: 0  •  glucose blood (ACCU-CHEK LÁZARO PLUS) test strip, Use as instructed to check blood sugar 4-6 times daily, Disp: 200 each, Rfl: 12  •  NOVOLOG 100 UNIT/ML injection, Use up to 100u daily via insulin pump, Disp: 30 mL, Rfl: 6  •  ONE TOUCH LANCETS misc, Test bs 4-8x/day, Disp: 750 each, Rfl: 3  •  SPRINTEC 28 0.25-35 MG-MCG per tablet, Take 1 tablet by mouth Daily., Disp: 28 tablet, Rfl: 6  •  Continuous Blood Gluc  (DEXCOM G6 ) device, 1 each Continuous., Disp: 1 Device, Rfl: 0  •  Continuous Blood Gluc Sensor (DEXCOM G6 SENSOR),  Every 10 (Ten) Days., Disp: 3 each, Rfl: 11  •  Continuous Blood Gluc Transmit (DEXCOM G6 TRANSMITTER) misc, 1 each Every 3 (Three) Months., Disp: 1 each, Rfl: 3    Review of Systems  Review of Systems   Constitutional: Negative for chills, fatigue, fever and unexpected weight change.   HENT: Negative for ear pain, hearing loss, nosebleeds, rhinorrhea and sore throat.    Eyes: Negative for pain, discharge, redness, itching and visual disturbance.   Respiratory: Negative for cough, shortness of breath and wheezing.    Cardiovascular: Negative for chest pain, palpitations and leg swelling.        Elevated HR   Gastrointestinal: Negative for abdominal pain, blood in stool, constipation and diarrhea.   Endocrine: Negative for cold intolerance, heat intolerance and polydipsia.   Genitourinary: Negative for dysuria, frequency, hematuria, pelvic pain, vaginal bleeding and vaginal discharge.   Musculoskeletal: Negative for arthralgias, gait problem, joint swelling and myalgias.   Skin: Negative for rash.   Allergic/Immunologic: Negative.    Neurological: Negative for dizziness, syncope, weakness, numbness and headaches.   Hematological: Negative for adenopathy. Does not bruise/bleed easily.   Psychiatric/Behavioral: Negative for sleep disturbance and suicidal ideas. The patient is not nervous/anxious.         Physical Exam    /80   Pulse 89   Wt 74.8 kg (165 lb)   SpO2 100% There is no height or weight on file to calculate BMI.  Physical Exam   Constitutional: She is oriented to person, place, and time. She appears well-developed. No distress.   HENT:   Head: Normocephalic.   Right Ear: External ear normal.   Left Ear: External ear normal.   Mouth/Throat: No oropharyngeal exudate.   Eyes: Conjunctivae and lids are normal. Right eye exhibits no discharge. Left eye exhibits no discharge. Right pupil is reactive. Left pupil is reactive.   Neck: No JVD present. No tracheal deviation present. No thyroid mass and no  thyromegaly present.   Cardiovascular: Normal rate, regular rhythm, normal heart sounds and intact distal pulses.   No murmur heard.  Pulmonary/Chest: Effort normal and breath sounds normal. No respiratory distress. She has no wheezes.   Abdominal: Soft. Bowel sounds are normal. There is no tenderness.   Musculoskeletal: She exhibits no edema or tenderness.     Vascular Status -  Her right foot exhibits no edema. Her left foot exhibits no edema.  Skin Integrity  -  Her right foot skin is intact.  She has no right foot onychomycosis, no right foot ulcer and non-callous right foot.Her left foot skin is intact. She has no left foot onychomycosis, no left foot ulcer and non-callous left foot..  Lymphadenopathy:     She has no cervical adenopathy.   Neurological: She is alert and oriented to person, place, and time.   Skin: Skin is warm, dry and intact. No rash noted. She is not diaphoretic. No erythema.   Psychiatric: She has a normal mood and affect. Her speech is normal and behavior is normal. Thought content normal.       Labs and Imaging   Lab Results   Component Value Date    HGBA1C 8.1 12/09/2019    HGBA1C 7.7 09/03/2019    HGBA1C 8.2 05/01/2019     Office Visit on 12/09/2019   Component Date Value Ref Range Status   • Glucose 12/09/2019 144* 70 - 130 mg/dL Final   • Hemoglobin A1C 12/09/2019 8.1  % Final     No images are attached to the encounter or orders placed in the encounter.  No Images in the past 120 days found..    Assessment / Plan   Gina was seen today for follow-up.    Diagnoses and all orders for this visit:    Uncontrolled type 1 diabetes mellitus with hyperglycemia (CMS/[facility])  -     POC Glucose Fingerstick  -     POC Glycosylated Hemoglobin (Hb A1C)  -     Continuous Blood Gluc  (DEXCOM G6 ) device; 1 each Continuous.  -     Continuous Blood Gluc Transmit (DEXCOM G6 TRANSMITTER) misc; 1 each Every 3 (Three) Months.  -     Continuous Blood Gluc Sensor (DEXCOM G6 SENSOR); Every 10  (Ten) Days.    Insulin pump titration    Diabetic hypoglycemia (CMS/[facility])        Diabetes Mellitus 1 is under inadequate control.  -A1c 7.7, down from 8.2 5/2019  -large variability in bs with frequent hypoglycemia   -pump/meter downloaded and reviewed- see summary above  -pt needs to put all bs readings and carbs into pump  -settings adjusted- slightly decrease basal rate in the evening  -pt agrees to CGM, will send rx for dexcom as this will help with overall bs control. She has a t flex pump.     1.  Diet: 3-4 carb servings per meal for females, 4-5 carb servings per meal for males  Spread carb intake throughout the day  Increase lean protein and vegetable intake  Avoid sugary drinks and processed carbs including crackers, cookies, cakes  2.  Exercise: Recommend at least 30 minutes of exercise daily, at least 5 days per week. Increase exercise gradually.   3.  Blood Glucose Goal: Blood glucose goal <150 fasting, <180 2 hr postprandial  4.  Microalbumin due 5/2020  5.  Education performed during this visit: long term diabetic complications discussed. , annual eye examinations at Ophthalmology discussed, dental hygiene discussed  and foot care reviewed., home glucose monitoring emphasized, all medications, side effects and compliance discussed carefully and Hypoglycemia management and prevention reviewed. Reviewed ‘ABCs’ of diabetes management (respective goals in parentheses):  A1C (<7), blood pressure (<130/80), and cholesterol (LDL <100, if CVD <70).    There are no Patient Instructions on file for this visit.    Follow up: Return in about 3 months (around 3/9/2020).    Discussed the nature of the disease including, risks, complications, implications, management, safe and proper use of medications. Encouraged therapeutic lifestyle changes including low calorie diet with plenty of fruits and vegetables, daily exercise, medication compliance, and keeping scheduled follow up appointments with me and any other  providers. Encouraged patient to have appointment for complete physical, fasting labs, appropriate screenings, and immunizations on an annual basis.      Tiffanie Rivero PA-C

## 2019-12-11 ENCOUNTER — TELEPHONE (OUTPATIENT)
Dept: INTERNAL MEDICINE | Facility: CLINIC | Age: 19
End: 2019-12-11

## 2019-12-11 NOTE — TELEPHONE ENCOUNTER
Please call [facility] his insurance does not cover the dexcom  It will cover something like the accu chek    Please call [phone]

## 2020-01-03 ENCOUNTER — TELEPHONE (OUTPATIENT)
Dept: INTERNAL MEDICINE | Facility: CLINIC | Age: 20
End: 2020-01-03

## 2020-01-03 NOTE — TELEPHONE ENCOUNTER
Pt informed that she needed to contact the [facility] pharmacy to give them consent to send her information to Dexcom because her Dexcom will have to be billed through DME. Phone number was given to her.

## 2020-01-27 DIAGNOSIS — E10.65 UNCONTROLLED TYPE 1 DIABETES MELLITUS WITH HYPERGLYCEMIA (HCC): Primary | ICD-10-CM

## 2020-06-22 ENCOUNTER — OFFICE VISIT (OUTPATIENT)
Dept: ENDOCRINOLOGY | Facility: CLINIC | Age: 20
End: 2020-06-22

## 2020-06-22 ENCOUNTER — LAB (OUTPATIENT)
Dept: LAB | Facility: HOSPITAL | Age: 20
End: 2020-06-22

## 2020-06-22 VITALS
DIASTOLIC BLOOD PRESSURE: 74 MMHG | SYSTOLIC BLOOD PRESSURE: 110 MMHG | OXYGEN SATURATION: 99 % | HEART RATE: 77 BPM | WEIGHT: 164.6 LBS

## 2020-06-22 DIAGNOSIS — E11.649 DIABETIC HYPOGLYCEMIA (HCC): ICD-10-CM

## 2020-06-22 DIAGNOSIS — Z46.81 INSULIN PUMP TITRATION: ICD-10-CM

## 2020-06-22 DIAGNOSIS — E10.65 UNCONTROLLED TYPE 1 DIABETES MELLITUS WITH HYPERGLYCEMIA (HCC): Primary | ICD-10-CM

## 2020-06-22 LAB
GLUCOSE BLDC GLUCOMTR-MCNC: 208 MG/DL (ref 70–130)
HBA1C MFR BLD: 8.2 %

## 2020-06-22 PROCEDURE — 82947 ASSAY GLUCOSE BLOOD QUANT: CPT | Performed by: PHYSICIAN ASSISTANT

## 2020-06-22 PROCEDURE — 99214 OFFICE O/P EST MOD 30 MIN: CPT | Performed by: PHYSICIAN ASSISTANT

## 2020-06-22 PROCEDURE — 80061 LIPID PANEL: CPT | Performed by: PHYSICIAN ASSISTANT

## 2020-06-22 PROCEDURE — 84443 ASSAY THYROID STIM HORMONE: CPT | Performed by: PHYSICIAN ASSISTANT

## 2020-06-22 PROCEDURE — 80053 COMPREHEN METABOLIC PANEL: CPT | Performed by: PHYSICIAN ASSISTANT

## 2020-06-22 PROCEDURE — 82043 UR ALBUMIN QUANTITATIVE: CPT | Performed by: PHYSICIAN ASSISTANT

## 2020-06-22 PROCEDURE — 83036 HEMOGLOBIN GLYCOSYLATED A1C: CPT | Performed by: PHYSICIAN ASSISTANT

## 2020-06-22 PROCEDURE — 82570 ASSAY OF URINE CREATININE: CPT | Performed by: PHYSICIAN ASSISTANT

## 2020-06-22 NOTE — PROGRESS NOTES
Chief Complaint  f/u for Diabetes Mellitus.    HPI   Gina Laws is a 19 y.o. female with celiac dz who is here today for f/u of Diabetes Mellitus type 1. The initial diagnosis of diabetes was made at age 8.    Pt reports she did not follow through to get Dexcom but is willing to try again. Reports her mom would really like her to have it.      A1C- 8.2 (6/22/2020), 8.1 (12/9/19), 7.7 (9/3/19), 8.2 (5/1/19)      Diabetic complications: none  Eye exam current (within one year): utd in [place] . No retinopathy per pt.   Foot care and dental care: discussed    Current diabetic medications include:  Novolog, Tandem insulin pump    Statin: no    Past medications: lantus    Diabetic Monitoring  -   Tandem pump downloaded and reviewed- 63% basal, 31% bolus, 6% correction bolus, checking bs 1-2x/day, mostly manual boluses, rarely enters carbs into pump  Meter downloaded and reviewed- bs ranging from 40-300s, checking sugar 1-4x/day    Dexcom was not covered by insurance.      Nutrition:     Current diet: counting carbs  Current exercise: running/ jogging  Seen RD in past: yes    The following portions of the patient's history were reviewed and updated by me as appropriate: allergies, current medications, past family history, past social history, past surgical history and problem list.      History reviewed. No pertinent past medical history.    Medications    Current Outpatient Medications:   •  Blood Glucose Monitoring Suppl (ACCU-CHEK LÁZARO PLUS) w/Device kit, Use device to check blood sugar daily, Disp: 1 kit, Rfl: 0  •  glucose blood (ACCU-CHEK LÁZARO PLUS) test strip, Use as instructed to check blood sugar 4-6 times daily, Disp: 200 each, Rfl: 12  •  insulin lispro (HumaLOG) 100 UNIT/ML injection, Take up to 100u daily via insulin pump as directed, Disp: 30 mL, Rfl: 6  •  ONE TOUCH LANCETS misc, Test bs 4-8x/day, Disp: 750 each, Rfl: 3    Review of Systems  Review of Systems   Constitutional: Negative for  chills, fatigue, fever and unexpected weight change.   HENT: Negative for ear pain, hearing loss, nosebleeds, rhinorrhea and sore throat.    Eyes: Negative for pain, discharge, redness, itching and visual disturbance.   Respiratory: Negative for cough, shortness of breath and wheezing.    Cardiovascular: Negative for chest pain, palpitations and leg swelling.        Elevated HR   Gastrointestinal: Negative for abdominal pain, blood in stool, constipation and diarrhea.   Endocrine: Negative for cold intolerance, heat intolerance and polydipsia.   Genitourinary: Negative for dysuria, frequency, hematuria, pelvic pain, vaginal bleeding and vaginal discharge.   Musculoskeletal: Negative for arthralgias, gait problem, joint swelling and myalgias.   Skin: Negative for rash.   Allergic/Immunologic: Negative.    Neurological: Negative for dizziness, syncope, weakness, numbness and headaches.   Hematological: Negative for adenopathy. Does not bruise/bleed easily.   Psychiatric/Behavioral: Negative for sleep disturbance and suicidal ideas. The patient is not nervous/anxious.         Physical Exam    /74   Pulse 77   Wt 74.7 kg (164 lb 9.6 oz)   SpO2 99% There is no height or weight on file to calculate BMI.  Physical Exam   Constitutional: She is oriented to person, place, and time. She appears well-developed. No distress.   HENT:   Head: Normocephalic.   Right Ear: External ear normal.   Left Ear: External ear normal.   Mouth/Throat: No oropharyngeal exudate.   Eyes: Conjunctivae and lids are normal. Right eye exhibits no discharge. Left eye exhibits no discharge. Right pupil is reactive. Left pupil is reactive.   Neck: No JVD present. No tracheal deviation present. No thyroid mass and no thyromegaly present.   Cardiovascular: Normal rate, regular rhythm, normal heart sounds and intact distal pulses.   No murmur heard.  Pulmonary/Chest: Effort normal and breath sounds normal. No respiratory distress. She has no  wheezes.   Abdominal: Soft. Bowel sounds are normal. There is no tenderness.   Musculoskeletal: She exhibits no edema or tenderness.    Gina had a diabetic foot exam performed today.   During the foot exam she had a monofilament test performed.    Neurological Sensory Findings - Unaltered hot/cold right ankle/foot discrimination and unaltered hot/cold left ankle/foot discrimination. Unaltered sharp/dull right ankle/foot discrimination and unaltered sharp/dull left ankle/foot discrimination. No right ankle/foot altered proprioception and no left ankle/foot altered proprioception  Vascular Status -  Her right foot exhibits normal foot vasculature  and no edema. Her left foot exhibits normal foot vasculature  and no edema.  Skin Integrity  -  Her right foot skin is intact.  She has no right foot onychomycosis, no right foot ulcer and non-callous right foot.Her left foot skin is intact. She has no left foot onychomycosis, no left foot ulcer and non-callous left foot..  Lymphadenopathy:     She has no cervical adenopathy.   Neurological: She is alert and oriented to person, place, and time.   Skin: Skin is warm, dry and intact. No rash noted. She is not diaphoretic. No erythema.   Psychiatric: She has a normal mood and affect. Her speech is normal and behavior is normal. Thought content normal.       Labs and Imaging   Lab Results   Component Value Date    HGBA1C 8.2 06/22/2020    HGBA1C 8.1 12/09/2019    HGBA1C 7.7 09/03/2019     Office Visit on 06/22/2020   Component Date Value Ref Range Status   • Glucose 06/22/2020 208* 70 - 130 mg/dL Final   • Hemoglobin A1C 06/22/2020 8.2  % Final     No images are attached to the encounter or orders placed in the encounter.  No Images in the past 120 days found..    Assessment / Plan   Gina was seen today for follow-up.    Diagnoses and all orders for this visit:    Uncontrolled type 1 diabetes mellitus with hyperglycemia (CMS/[facility])  -     POC Glucose Fingerstick  -     POC  Glycosylated Hemoglobin (Hb A1C)  -     insulin lispro (HumaLOG) 100 UNIT/ML injection; Take up to 100u daily via insulin pump as directed  -     Microalbumin / Creatinine Urine Ratio - Urine, Random Void  -     Comprehensive Metabolic Panel  -     Lipid Panel  -     TSH    Insulin pump titration    Diabetic hypoglycemia (CMS/HCC)        Diabetes Mellitus 1 is under inadequate control.  -A1c 8.2, mostly due to inadequate bs checks and not entering carbs into pump. She mainly uses manual bolus  -she does have a very aggressive carb ratio in the evening that I have changed to be similar to other carb ratios  -we discussed in order to get bs under control will need to check sugar and count carbs and enter both into the pump every time she consumes carbs. This will provide information on whether settings need to be changed  -will try to send dexcom again and see if covered  -contacted [employer] to see if she is due for pump upgrade    1.  Diet: 3-4 carb servings per meal for females, 4-5 carb servings per meal for males  Spread carb intake throughout the day  Increase lean protein and vegetable intake  Avoid sugary drinks and processed carbs including crackers, cookies, cakes  2.  Exercise: Recommend at least 30 minutes of exercise daily, at least 5 days per week. Increase exercise gradually.   3.  Blood Glucose Goal: Blood glucose goal <150 fasting, <180 2 hr postprandial  4.  Microalbumin due 5/2020  5.  Education performed during this visit: long term diabetic complications discussed. , annual eye examinations at Ophthalmology discussed, dental hygiene discussed  and foot care reviewed., home glucose monitoring emphasized, all medications, side effects and compliance discussed carefully and Hypoglycemia management and prevention reviewed. Reviewed ‘ABCs’ of diabetes management (respective goals in parentheses):  A1C (<7), blood pressure (<130/80), and cholesterol (LDL <100, if CVD <70).    There are no Patient  Instructions on file for this visit.    Follow up: Return in about 3 months (around 9/22/2020).    Discussed the nature of the disease including, risks, complications, implications, management, safe and proper use of medications. Encouraged therapeutic lifestyle changes including low calorie diet with plenty of fruits and vegetables, daily exercise, medication compliance, and keeping scheduled follow up appointments with me and any other providers. Encouraged patient to have appointment for complete physical, fasting labs, appropriate screenings, and immunizations on an annual basis.      Tiffanie Rivero PA-C    Addendum 8/25/2020- pt is testing bs 4x/day

## 2020-06-23 LAB
ALBUMIN SERPL-MCNC: 4.2 G/DL (ref 3.5–5.2)
ALBUMIN UR-MCNC: 1.4 MG/DL
ALBUMIN/GLOB SERPL: 1.5 G/DL
ALP SERPL-CCNC: 77 U/L (ref 39–117)
ALT SERPL W P-5'-P-CCNC: 13 U/L (ref 1–33)
ANION GAP SERPL CALCULATED.3IONS-SCNC: 8.1 MMOL/L (ref 5–15)
AST SERPL-CCNC: 14 U/L (ref 1–32)
BILIRUB SERPL-MCNC: 0.9 MG/DL (ref 0.2–1.2)
BUN BLD-MCNC: 12 MG/DL (ref 6–20)
BUN/CREAT SERPL: 17.6 (ref 7–25)
CALCIUM SPEC-SCNC: 9.3 MG/DL (ref 8.6–10.5)
CHLORIDE SERPL-SCNC: 101 MMOL/L (ref 98–107)
CHOLEST SERPL-MCNC: 154 MG/DL (ref 0–200)
CO2 SERPL-SCNC: 29.9 MMOL/L (ref 22–29)
CREAT BLD-MCNC: 0.68 MG/DL (ref 0.57–1)
CREAT UR-MCNC: 148.8 MG/DL
GFR SERPL CREATININE-BSD FRML MDRD: 109 ML/MIN/1.73
GLOBULIN UR ELPH-MCNC: 2.8 GM/DL
GLUCOSE BLD-MCNC: 168 MG/DL (ref 65–99)
HDLC SERPL-MCNC: 53 MG/DL (ref 40–60)
LDLC SERPL CALC-MCNC: 91 MG/DL (ref 0–100)
LDLC/HDLC SERPL: 1.72 {RATIO}
MICROALBUMIN/CREAT UR: 9.4 MG/G
POTASSIUM BLD-SCNC: 4.3 MMOL/L (ref 3.5–5.2)
PROT SERPL-MCNC: 7 G/DL (ref 6–8.5)
SODIUM BLD-SCNC: 139 MMOL/L (ref 136–145)
TRIGL SERPL-MCNC: 50 MG/DL (ref 0–150)
TSH SERPL DL<=0.05 MIU/L-ACNC: 1.15 UIU/ML (ref 0.27–4.2)
VLDLC SERPL-MCNC: 10 MG/DL (ref 5–40)

## 2020-06-23 RX ORDER — PROCHLORPERAZINE 25 MG/1
1 SUPPOSITORY RECTAL
Qty: 1 EACH | Refills: 3 | Status: SHIPPED | OUTPATIENT
Start: 2020-06-23

## 2020-06-23 RX ORDER — PROCHLORPERAZINE 25 MG/1
SUPPOSITORY RECTAL
Qty: 3 EACH | Refills: 12 | Status: SHIPPED | OUTPATIENT
Start: 2020-06-23

## 2020-06-23 RX ORDER — PROCHLORPERAZINE 25 MG/1
1 SUPPOSITORY RECTAL CONTINUOUS
Qty: 1 DEVICE | Refills: 0 | Status: SHIPPED | OUTPATIENT
Start: 2020-06-23

## 2020-07-07 ENCOUNTER — TELEPHONE (OUTPATIENT)
Dept: ENDOCRINOLOGY | Facility: CLINIC | Age: 20
End: 2020-07-07

## 2020-07-07 NOTE — TELEPHONE ENCOUNTER
Left a message explaining that Cameron from [employer] was having a hard time finding her in their system. He is aware that she got supplies from [facility] but they said that was only ome time. I asked her to call me and let me know who originally shipped her the [employer] pump so that I could get that info to Cameron.

## 2020-08-14 ENCOUNTER — TELEPHONE (OUTPATIENT)
Dept: ENDOCRINOLOGY | Facility: CLINIC | Age: 20
End: 2020-08-14

## 2020-08-14 NOTE — TELEPHONE ENCOUNTER
MOTHER CALLED TO REQUEST THAT THE PATIENT'S OFFICE NOTES NEED TO REFLECT THAT THE PATIENT IS TESTING A MINIMUM OF 4 TIMES DAILY. PLEASE PROVIDE UPDATED INFORMATION TO [facility].

## 2020-08-20 NOTE — TELEPHONE ENCOUNTER
PT's voice,ail was full, LM on mom's voicemail telling her that we would be happy to change the chart notes if she wanted to download her pump/meter so that we would have a report reflecting that she was truly testing 4x daily. I asked her to call with questions.

## 2020-08-25 NOTE — TELEPHONE ENCOUNTER
We received a copy of her blood sugar log so Tiffanie addend the chart note, copy mailed to patient.

## 2020-08-27 ENCOUNTER — TELEPHONE (OUTPATIENT)
Dept: ENDOCRINOLOGY | Facility: CLINIC | Age: 20
End: 2020-08-27

## 2020-08-27 NOTE — TELEPHONE ENCOUNTER
[facility] CALLED TO SEE IF WE RECEIVED THE FAX FOR TESTING 4 TIMES A DAY    PLEASE CALL THEM BACK

## 2020-08-28 NOTE — TELEPHONE ENCOUNTER
PATIENTS MOTHER IS WANTING US TO FAX WHAT [facility] NEEDS TO A FAX NUMBER SHE HAS SO SHE CAN E-MAIL THE INFORMATION TO [facility]. PATIENTS ADDRESS HAD CHANGED SO ITS UPDATED TODAY. FAX NUMBER [phone] THIS IS MOTHERS FAX NUMBER OK TO FAX INFORMATION

## 2020-08-28 NOTE — TELEPHONE ENCOUNTER
Left message on pt's mother's voicemail letting her know that we did receive it, office note was updated and a copy was mailed to Gina. I advised her to call with questions

## 2020-12-04 ENCOUNTER — TELEPHONE (OUTPATIENT)
Dept: ENDOCRINOLOGY | Facility: CLINIC | Age: 20
End: 2020-12-04

## 2020-12-04 NOTE — TELEPHONE ENCOUNTER
Spoke with Mom, she stated that patient has been having a lot of lows and had one last night and she could not wake her up.  She feels her pump needs adjusting.  Patient appt is not until 1/5/21  Please advise. I have printed off her dexcom report.  She would like to know something before going into the weekend.

## 2020-12-04 NOTE — TELEPHONE ENCOUNTER
PLEASE CALL SHE IS HAVING SOME LOW BLOOD SUGARS THIS AM IT WAS 40 AND IT TOOK AWHILE TO GET IT BACK UP    PLEASE CALL [phone]

## 2020-12-04 NOTE — TELEPHONE ENCOUNTER
Spoke with Mom advised about the message, expressed understanding and they will call back if anything changes or need more adjusting

## 2021-01-05 ENCOUNTER — OFFICE VISIT (OUTPATIENT)
Dept: ENDOCRINOLOGY | Facility: CLINIC | Age: 21
End: 2021-01-05

## 2021-01-05 VITALS
TEMPERATURE: 96.9 F | OXYGEN SATURATION: 99 % | DIASTOLIC BLOOD PRESSURE: 82 MMHG | WEIGHT: 166.2 LBS | HEART RATE: 76 BPM | SYSTOLIC BLOOD PRESSURE: 122 MMHG

## 2021-01-05 DIAGNOSIS — E10.9 TYPE 1 DIABETES MELLITUS WITHOUT COMPLICATION (HCC): Primary | Chronic | ICD-10-CM

## 2021-01-05 LAB
GLUCOSE BLDC GLUCOMTR-MCNC: 125 MG/DL (ref 70–130)
HBA1C MFR BLD: 7.2 %

## 2021-01-05 PROCEDURE — 83036 HEMOGLOBIN GLYCOSYLATED A1C: CPT | Performed by: PHYSICIAN ASSISTANT

## 2021-01-05 PROCEDURE — 95251 CONT GLUC MNTR ANALYSIS I&R: CPT | Performed by: PHYSICIAN ASSISTANT

## 2021-01-05 PROCEDURE — 99215 OFFICE O/P EST HI 40 MIN: CPT | Performed by: PHYSICIAN ASSISTANT

## 2021-01-05 NOTE — PROGRESS NOTES
Chief Complaint  f/u for Diabetes Mellitus.    HPI   Gina Laws is a 20 y.o. female with celiac dz who is here today for f/u of Diabetes Mellitus type 1. The initial diagnosis of diabetes was made at age 8.    Has started using Dexcom though sensor stopped working last night.   Has done somewhat better at putting carbs into pump.  She snacks during the day, doesn't bolus due to fear of hypoglycemia. Rides horses during the day.   Has fear of night time hypoglycemia.     A1C- 7.2 (1/5/2021), 8.2 (6/22/2020), 8.1 (12/9/19), 7.7 (9/3/19), 8.2 (5/1/19)      Diabetic complications: none  Eye exam current (within one year): utd in [place] . No retinopathy per pt.   Foot care and dental care: discussed    Current diabetic medications include:  Novolog, Tandem insulin pump    Statin: no    Past medications: lantus    Diabetic Monitoring  -   Tandem pump/dexcom G6 downloaded and reviewed- 63% basal, 36% bolus, 1% correction bolus, large variability in BG from 40s to 400s, mostly relying on manual bolus though has done a bit better with adding BG and cabs to pump      Dexcom was not covered by insurance.      Nutrition:     Current diet: counting carbs  Current exercise: running/ jogging  Seen RD in past: yes    The following portions of the patient's history were reviewed and updated by me as appropriate: allergies, current medications, past family history, past social history, past surgical history and problem list.        History reviewed. No pertinent past medical history.    Medications    Current Outpatient Medications:   •  Blood Glucose Monitoring Suppl (ACCU-CHEK LÁZARO PLUS) w/Device kit, Use device to check blood sugar daily, Disp: 1 kit, Rfl: 0  •  Continuous Blood Gluc  (DEXCOM G6 ) device, 1 Device Continuous., Disp: 1 Device, Rfl: 0  •  Continuous Blood Gluc Sensor (DEXCOM G6 SENSOR), Every 10 (Ten) Days., Disp: 3 each, Rfl: 12  •  Continuous Blood Gluc Transmit (DEXCOM G6 TRANSMITTER)  misc, 1 each Every 3 (Three) Months., Disp: 1 each, Rfl: 3  •  glucose blood (ACCU-CHEK LÁZARO PLUS) test strip, Use as instructed to check blood sugar 4-6 times daily, Disp: 200 each, Rfl: 12  •  insulin lispro (HumaLOG) 100 UNIT/ML injection, Take up to 100u daily via insulin pump as directed, Disp: 90 mL, Rfl: 1  •  ONE TOUCH LANCETS misc, Test bs 4-8x/day, Disp: 750 each, Rfl: 3    Review of Systems  Review of Systems   Constitutional: Negative for chills, fatigue, fever and unexpected weight change.   HENT: Negative for ear pain, hearing loss, nosebleeds, rhinorrhea and sore throat.    Eyes: Negative for pain, discharge, redness, itching and visual disturbance.   Respiratory: Negative for cough, shortness of breath and wheezing.    Cardiovascular: Negative for chest pain, palpitations and leg swelling.        Elevated HR   Gastrointestinal: Negative for abdominal pain, blood in stool, constipation and diarrhea.   Endocrine: Negative for cold intolerance, heat intolerance and polydipsia.   Genitourinary: Negative for dysuria, frequency, hematuria, pelvic pain, vaginal bleeding and vaginal discharge.   Musculoskeletal: Negative for arthralgias, gait problem, joint swelling and myalgias.   Skin: Negative for rash.   Allergic/Immunologic: Negative.    Neurological: Negative for dizziness, syncope, weakness, numbness and headaches.   Hematological: Negative for adenopathy. Does not bruise/bleed easily.   Psychiatric/Behavioral: Negative for sleep disturbance and suicidal ideas. The patient is not nervous/anxious.         Physical Exam    /82   Pulse 76   Temp 96.9 °F (36.1 °C)   Wt 75.4 kg (166 lb 3.2 oz)   SpO2 99% There is no height or weight on file to calculate BMI.  Physical Exam   Constitutional: She is oriented to person, place, and time. She appears well-developed. No distress.   HENT:   Head: Normocephalic.   Right Ear: External ear normal.   Left Ear: External ear normal.   Mouth/Throat: No  oropharyngeal exudate.   Eyes: Conjunctivae and lids are normal. Right eye exhibits no discharge. Left eye exhibits no discharge. Right pupil is reactive. Left pupil is reactive.   Neck: No JVD present. No tracheal deviation present. No thyroid mass and no thyromegaly present.   Cardiovascular: Normal rate, regular rhythm and normal heart sounds.   No murmur heard.  Pulmonary/Chest: Effort normal and breath sounds normal. No respiratory distress. She has no wheezes.   Abdominal: Soft. Bowel sounds are normal. There is no abdominal tenderness.   Musculoskeletal: No tenderness.   Lymphadenopathy:     She has no cervical adenopathy.   Neurological: She is alert and oriented to person, place, and time.   Skin: Skin is warm and dry. No rash noted. She is not diaphoretic. No erythema.   Psychiatric: Her speech is normal and behavior is normal. Thought content normal.       Labs and Imaging   Lab Results   Component Value Date    HGBA1C 7.2 01/05/2021    HGBA1C 8.2 06/22/2020    HGBA1C 8.1 12/09/2019     Office Visit on 01/05/2021   Component Date Value Ref Range Status   • Glucose 01/05/2021 125  70 - 130 mg/dL Final   • Hemoglobin A1C 01/05/2021 7.2  % Final     No images are attached to the encounter or orders placed in the encounter.  No Images in the past 120 days found..    Assessment / Plan   Diagnoses and all orders for this visit:    1. Type 1 diabetes mellitus without complication (CMS/[facility]) (Primary)  -     insulin lispro (HumaLOG) 100 UNIT/ML injection; Take up to 100u daily via insulin pump as directed  Dispense: 90 mL; Refill: 1  -     POC Glucose, Blood  -     POC Glycosylated Hemoglobin (Hb A1C)        Diabetes Mellitus 1 is under inadequate control.  -A1c 7.2, down from 8.2 last visit  -tandem pump/dexcom downloaded and reviewed- large variability from 40s to 400s, mostly relying on manual boluses with occasional BG/carb entries  -has day time and night time hypoglycemia  -fear of night time  hypoglycemia  -decrease basal rate over all, weaken carb ratio and correction with emphasis on putting all BG readings and carbs into pump before eating (snacks and carbs)  -if she continues to have persistent hypoglycemia <70 and hyperglycemia >200 to let me know for further setting adjustments  -current pump is in warranty until 5/2022. Will be eligible for control IQ at that time  -diabetes maintenance labs are utd    1.  Diet: 3-4 carb servings per meal for females, 4-5 carb servings per meal for males  Spread carb intake throughout the day  Increase lean protein and vegetable intake  Avoid sugary drinks and processed carbs including crackers, cookies, cakes  2.  Exercise: Recommend at least 30 minutes of exercise daily, at least 5 days per week. Increase exercise gradually.   3.  Blood Glucose Goal: Blood glucose goal <150 fasting, <180 2 hr postprandial  4.  Microalbumin due 6/2021  5.  Education performed during this visit: long term diabetic complications discussed. , annual eye examinations at Ophthalmology discussed, dental hygiene discussed  and foot care reviewed., home glucose monitoring emphasized, all medications, side effects and compliance discussed carefully and Hypoglycemia management and prevention reviewed. Reviewed ‘ABCs’ of diabetes management (respective goals in parentheses):  A1C (<7), blood pressure (<130/80), and cholesterol (LDL <100, if CVD <70).    There are no Patient Instructions on file for this visit.    Follow up: Return in about 3 months (around 4/5/2021).    Discussed the nature of the disease including, risks, complications, implications, management, safe and proper use of medications. Encouraged therapeutic lifestyle changes including low calorie diet with plenty of fruits and vegetables, daily exercise, medication compliance, and keeping scheduled follow up appointments with me and any other providers. Encouraged patient to have appointment for complete physical, fasting  labs, appropriate screenings, and immunizations on an annual basis.    40 minutes were spent for chart review, charting, review of labs/tests, coordination of care and counselling regarding identified problems as outlined in the objective, assessment and discussion portions of the documentation.      Tiffanie Rivero PA-C

## 2021-07-23 ENCOUNTER — OFFICE VISIT (OUTPATIENT)
Dept: ENDOCRINOLOGY | Facility: CLINIC | Age: 21
End: 2021-07-23

## 2021-07-23 ENCOUNTER — LAB (OUTPATIENT)
Dept: LAB | Facility: HOSPITAL | Age: 21
End: 2021-07-23

## 2021-07-23 VITALS
BODY MASS INDEX: 26.48 KG/M2 | OXYGEN SATURATION: 98 % | HEART RATE: 77 BPM | WEIGHT: 164.8 LBS | SYSTOLIC BLOOD PRESSURE: 130 MMHG | HEIGHT: 66 IN | DIASTOLIC BLOOD PRESSURE: 80 MMHG

## 2021-07-23 DIAGNOSIS — E10.65 UNCONTROLLED TYPE 1 DIABETES MELLITUS WITH HYPERGLYCEMIA (HCC): Primary | Chronic | ICD-10-CM

## 2021-07-23 DIAGNOSIS — E11.649 DIABETIC HYPOGLYCEMIA (HCC): Chronic | ICD-10-CM

## 2021-07-23 LAB
ALBUMIN SERPL-MCNC: 4.3 G/DL (ref 3.5–5.2)
ALBUMIN/GLOB SERPL: 1.3 G/DL
ALP SERPL-CCNC: 70 U/L (ref 39–117)
ALT SERPL W P-5'-P-CCNC: 16 U/L (ref 1–33)
ANION GAP SERPL CALCULATED.3IONS-SCNC: 8 MMOL/L (ref 5–15)
AST SERPL-CCNC: 18 U/L (ref 1–32)
BILIRUB SERPL-MCNC: 0.5 MG/DL (ref 0–1.2)
BUN SERPL-MCNC: 17 MG/DL (ref 6–20)
BUN/CREAT SERPL: 24.3 (ref 7–25)
CALCIUM SPEC-SCNC: 9.4 MG/DL (ref 8.6–10.5)
CHLORIDE SERPL-SCNC: 102 MMOL/L (ref 98–107)
CHOLEST SERPL-MCNC: 159 MG/DL (ref 0–200)
CO2 SERPL-SCNC: 29 MMOL/L (ref 22–29)
CREAT SERPL-MCNC: 0.7 MG/DL (ref 0.57–1)
GFR SERPL CREATININE-BSD FRML MDRD: 105 ML/MIN/1.73
GLOBULIN UR ELPH-MCNC: 3.2 GM/DL
GLUCOSE BLDC GLUCOMTR-MCNC: 101 MG/DL (ref 70–130)
GLUCOSE SERPL-MCNC: 105 MG/DL (ref 65–99)
HBA1C MFR BLD: 8 %
HDLC SERPL-MCNC: 55 MG/DL (ref 40–60)
LDLC SERPL CALC-MCNC: 90 MG/DL (ref 0–100)
LDLC/HDLC SERPL: 1.63 {RATIO}
POTASSIUM SERPL-SCNC: 4.1 MMOL/L (ref 3.5–5.2)
PROT SERPL-MCNC: 7.5 G/DL (ref 6–8.5)
SODIUM SERPL-SCNC: 139 MMOL/L (ref 136–145)
TRIGL SERPL-MCNC: 72 MG/DL (ref 0–150)
TSH SERPL DL<=0.05 MIU/L-ACNC: 2.73 UIU/ML (ref 0.27–4.2)
VLDLC SERPL-MCNC: 14 MG/DL (ref 5–40)

## 2021-07-23 PROCEDURE — 82043 UR ALBUMIN QUANTITATIVE: CPT | Performed by: PHYSICIAN ASSISTANT

## 2021-07-23 PROCEDURE — 80053 COMPREHEN METABOLIC PANEL: CPT | Performed by: PHYSICIAN ASSISTANT

## 2021-07-23 PROCEDURE — 82947 ASSAY GLUCOSE BLOOD QUANT: CPT | Performed by: PHYSICIAN ASSISTANT

## 2021-07-23 PROCEDURE — 82570 ASSAY OF URINE CREATININE: CPT | Performed by: PHYSICIAN ASSISTANT

## 2021-07-23 PROCEDURE — 83036 HEMOGLOBIN GLYCOSYLATED A1C: CPT | Performed by: PHYSICIAN ASSISTANT

## 2021-07-23 PROCEDURE — 84443 ASSAY THYROID STIM HORMONE: CPT | Performed by: PHYSICIAN ASSISTANT

## 2021-07-23 PROCEDURE — 99214 OFFICE O/P EST MOD 30 MIN: CPT | Performed by: PHYSICIAN ASSISTANT

## 2021-07-23 PROCEDURE — 80061 LIPID PANEL: CPT | Performed by: PHYSICIAN ASSISTANT

## 2021-07-23 NOTE — PROGRESS NOTES
Chief Complaint  f/u for Diabetes Mellitus.    HPI   Gina Laws is a 20 y.o. female with celiac dz who is here today for f/u of Diabetes Mellitus type 1. The initial diagnosis of diabetes was made at age 8.    Has not been using Dexcom.   Has done somewhat better at putting carbs into pump but is still having post prandial hyperglycemia    A1C- 8 (7/23/2021), 7.2 (1/5/2021), 8.2 (6/22/2020), 8.1 (12/9/19), 7.7 (9/3/19), 8.2 (5/1/19)      Diabetic complications: none  Eye exam current (within one year): due    Current diabetic medications include:  Novolog, Tandem insulin pump    Statin: no    Past medications: lantus    Diabetic Monitoring  -   Meter downloaded and reviewed: She is testing blood sugar 2-4 times per day, BG ranging from , this week fasting blood sugar has been ranging from 117-150 with the exception of 474 1 morning, large variability in blood sugar during the day    We were unable to download her tandem pump due to technical difficulties    Is not using her Dexcom    The following portions of the patient's history were reviewed and updated by me as appropriate: allergies, current medications, past family history, past social history, past surgical history and problem list.      History reviewed. No pertinent past medical history.    Medications    Current Outpatient Medications:   •  Blood Glucose Monitoring Suppl (ACCU-CHEK LÁZARO PLUS) w/Device kit, Use device to check blood sugar daily, Disp: 1 kit, Rfl: 0  •  Continuous Blood Gluc Transmit (DEXCOM G6 TRANSMITTER) misc, 1 each Every 3 (Three) Months., Disp: 1 each, Rfl: 3  •  glucose blood (ACCU-CHEK LÁZARO PLUS) test strip, Use as instructed to check blood sugar 4-6 times daily, Disp: 200 each, Rfl: 12  •  insulin lispro (HumaLOG) 100 UNIT/ML injection, Take up to 100u daily via insulin pump as directed, Disp: 90 mL, Rfl: 1  •  ONE TOUCH LANCETS misc, Test bs 4-8x/day, Disp: 750 each, Rfl: 3  •  Continuous Blood Gluc  (DEXCOM  "G6 ) device, 1 Device Continuous., Disp: 1 Device, Rfl: 0  •  Continuous Blood Gluc Sensor (DEXCOM G6 SENSOR), Every 10 (Ten) Days., Disp: 3 each, Rfl: 12    Review of Systems  Review of Systems   Constitutional: Negative for chills, fatigue, fever and unexpected weight change.   HENT: Negative for ear pain, hearing loss, nosebleeds, rhinorrhea and sore throat.    Eyes: Negative for pain, discharge, redness, itching and visual disturbance.   Respiratory: Negative for cough, shortness of breath and wheezing.    Cardiovascular: Negative for chest pain, palpitations and leg swelling.   Gastrointestinal: Negative for abdominal pain, blood in stool, constipation and diarrhea.   Endocrine: Negative for cold intolerance, heat intolerance and polydipsia.   Genitourinary: Negative for dysuria, frequency, hematuria, pelvic pain, vaginal bleeding and vaginal discharge.   Musculoskeletal: Negative for arthralgias, gait problem, joint swelling and myalgias.   Skin: Negative for rash.   Allergic/Immunologic: Negative.    Neurological: Negative for dizziness, syncope, weakness, numbness and headaches.   Hematological: Negative for adenopathy. Does not bruise/bleed easily.   Psychiatric/Behavioral: Negative for sleep disturbance and suicidal ideas. The patient is not nervous/anxious.         Physical Exam    /80   Pulse 77   Ht 167.6 cm (66\")   Wt 74.8 kg (164 lb 12.8 oz)   SpO2 98%   BMI 26.60 kg/m² Body mass index is 26.6 kg/m².  Physical Exam   Constitutional: She is oriented to person, place, and time. She appears well-developed. No distress.   HENT:   Head: Normocephalic.   Right Ear: External ear normal.   Left Ear: External ear normal.   Mouth/Throat: No oropharyngeal exudate.   Eyes: Conjunctivae and lids are normal. Right eye exhibits no discharge. Left eye exhibits no discharge. Right pupil is reactive. Left pupil is reactive.   Neck: No JVD present. No tracheal deviation present. No thyroid mass and no " thyromegaly present.   Cardiovascular: Normal rate, regular rhythm and normal heart sounds.   No murmur heard.  Pulmonary/Chest: Effort normal and breath sounds normal. No respiratory distress. She has no wheezes.   Abdominal: Soft. Bowel sounds are normal. There is no abdominal tenderness.   Musculoskeletal: No tenderness.    Gina had a diabetic foot exam performed today.   During the foot exam she had a monofilament test performed.    Neurological Sensory Findings - Unaltered hot/cold right ankle/foot discrimination and unaltered hot/cold left ankle/foot discrimination. Unaltered sharp/dull right ankle/foot discrimination and unaltered sharp/dull left ankle/foot discrimination. No right ankle/foot altered proprioception and no left ankle/foot altered proprioception  Vascular Status -  Her right foot exhibits normal foot vasculature  and no edema. Her left foot exhibits normal foot vasculature  and no edema.  Skin Integrity  -  Her right foot skin is intact.  She has no right foot onychomycosis, no right foot ulcer and non-callous right foot.Her left foot skin is intact. She has no left foot onychomycosis, no left foot ulcer and non-callous left foot..  Lymphadenopathy:     She has no cervical adenopathy.   Neurological: She is alert and oriented to person, place, and time.   Skin: Skin is warm and dry. No rash noted. She is not diaphoretic. No erythema.   Psychiatric: Her speech is normal and behavior is normal. Thought content normal.       Labs and Imaging   Lab Results   Component Value Date    HGBA1C 8.0 07/23/2021    HGBA1C 7.2 01/05/2021    HGBA1C 8.2 06/22/2020     Office Visit on 07/23/2021   Component Date Value Ref Range Status   • Glucose 07/23/2021 101  70 - 130 mg/dL Final   • Hemoglobin A1C 07/23/2021 8.0  % Final     No images are attached to the encounter or orders placed in the encounter.  No Images in the past 120 days found..    Assessment / Plan   Diagnoses and all orders for this  visit:    1. Uncontrolled type 1 diabetes mellitus with hyperglycemia (CMS/HCC) (Primary)  -     POC Glucose, Blood  -     POC Glycosylated Hemoglobin (Hb A1C)  -     Microalbumin / Creatinine Urine Ratio - Urine, Clean Catch  -     Comprehensive Metabolic Panel  -     Lipid Panel  -     TSH  -     insulin lispro (HumaLOG) 100 UNIT/ML injection; Take up to 100u daily via insulin pump as directed  Dispense: 90 mL; Refill: 1  -     Ambulatory Referral for Diabetic Eye Exam-Optometry    2. Diabetic hypoglycemia (CMS/HCC)        Diabetes Mellitus 1 is under inadequate control.  -A1c 7.2, down from 8.2 last visit  -Meter downloaded and reviewed: She is testing blood sugar 2-4 times per day, BG ranging from , this week fasting blood sugar has been ranging from 117-150 with the exception of 474 1 morning, large variability in blood sugar during the day  -We were unable to download her pump due to technical difficulties  -She reports postprandial hyperglycemia even when entering correct carbohydrates into pump  -Strengthen carb ratio from 7-6  -Encouraged resuming Dexcom use  -current pump is in warranty until 5/8/2022. Will be eligible for control IQ at that time  -diabetes maintenance labs and foot exam updated today  -Eye exam is due and referral was made    1.  Diet: 3-4 carb servings per meal for females, 4-5 carb servings per meal for males  Spread carb intake throughout the day  Increase lean protein and vegetable intake  Avoid sugary drinks and processed carbs including crackers, cookies, cakes  2.  Exercise: Recommend at least 30 minutes of exercise daily, at least 5 days per week. Increase exercise gradually.   3.  Blood Glucose Goal: Blood glucose goal <150 fasting, <180 2 hr postprandial  4.  Microalbumin due 6/2021  5.  Education performed during this visit: long term diabetic complications discussed. , annual eye examinations at Ophthalmology discussed, dental hygiene discussed  and foot care reviewed.,  home glucose monitoring emphasized, all medications, side effects and compliance discussed carefully and Hypoglycemia management and prevention reviewed. Reviewed ‘ABCs’ of diabetes management (respective goals in parentheses):  A1C (<7), blood pressure (<130/80), and cholesterol (LDL <100, if CVD <70).    There are no Patient Instructions on file for this visit.    Follow up: Return in about 3 months (around 10/23/2021).    Discussed the nature of the disease including, risks, complications, implications, management, safe and proper use of medications. Encouraged therapeutic lifestyle changes including low calorie diet with plenty of fruits and vegetables, daily exercise, medication compliance, and keeping scheduled follow up appointments with me and any other providers. Encouraged patient to have appointment for complete physical, fasting labs, appropriate screenings, and immunizations on an annual basis.        Tiffanie Rivero PA-C

## 2021-07-24 LAB
ALBUMIN UR-MCNC: <1.2 MG/DL
CREAT UR-MCNC: 31.7 MG/DL
MICROALBUMIN/CREAT UR: NORMAL MG/G{CREAT}

## 2021-11-15 ENCOUNTER — OFFICE VISIT (OUTPATIENT)
Dept: ENDOCRINOLOGY | Facility: CLINIC | Age: 21
End: 2021-11-15

## 2021-11-15 VITALS
SYSTOLIC BLOOD PRESSURE: 104 MMHG | BODY MASS INDEX: 24.04 KG/M2 | WEIGHT: 149.6 LBS | HEART RATE: 69 BPM | DIASTOLIC BLOOD PRESSURE: 58 MMHG | HEIGHT: 66 IN | OXYGEN SATURATION: 97 %

## 2021-11-15 DIAGNOSIS — E10.65 UNCONTROLLED TYPE 1 DIABETES MELLITUS WITH HYPERGLYCEMIA (HCC): Primary | Chronic | ICD-10-CM

## 2021-11-15 DIAGNOSIS — E11.649 DIABETIC HYPOGLYCEMIA (HCC): Chronic | ICD-10-CM

## 2021-11-15 LAB
EXPIRATION DATE: ABNORMAL
EXPIRATION DATE: NORMAL
GLUCOSE BLDC GLUCOMTR-MCNC: 217 MG/DL (ref 70–130)
HBA1C MFR BLD: 7.4 %
Lab: ABNORMAL
Lab: NORMAL

## 2021-11-15 PROCEDURE — 82947 ASSAY GLUCOSE BLOOD QUANT: CPT | Performed by: PHYSICIAN ASSISTANT

## 2021-11-15 PROCEDURE — 99214 OFFICE O/P EST MOD 30 MIN: CPT | Performed by: PHYSICIAN ASSISTANT

## 2021-11-15 PROCEDURE — 83036 HEMOGLOBIN GLYCOSYLATED A1C: CPT | Performed by: PHYSICIAN ASSISTANT

## 2021-11-15 NOTE — PROGRESS NOTES
Chief Complaint  F/u for Diabetes Mellitus.    HPI   Gina Laws is a 21 y.o. female with celiac dz who is here today for f/u of Diabetes Mellitus type 1. The initial diagnosis of diabetes was made at age 8.    A1C- 7.4 (11/15/21), 8 (7/23/2021), 7.2 (1/5/2021), 8.2 (6/22/2020), 8.1 (12/9/19), 7.7 (9/3/19), 8.2 (5/1/19)      Diabetic complications: none  Eye exam current (within one year): due    Current diabetic medications include:  Novolog, Tandem insulin pump    Statin: no    Past medications: lantus    Diabetic Monitoring  -   Meter downloaded and reviewed: She is testing blood sugar 1-3 times per day, BG ranging from 50s to 400s    We were unable to download her tandem pump due to technical difficulties    Is not using her Dexcom    The following portions of the patient's history were reviewed and updated by me as appropriate: allergies, current medications, past family history, past social history, past surgical history and problem list.      History reviewed. No pertinent past medical history.    Medications    Current Outpatient Medications:   •  Blood Glucose Monitoring Suppl (ACCU-CHEK LÁZARO PLUS) w/Device kit, Use device to check blood sugar daily, Disp: 1 kit, Rfl: 0  •  Continuous Blood Gluc  (DEXCOM G6 ) device, 1 Device Continuous., Disp: 1 Device, Rfl: 0  •  Continuous Blood Gluc Sensor (DEXCOM G6 SENSOR), Every 10 (Ten) Days., Disp: 3 each, Rfl: 12  •  Continuous Blood Gluc Transmit (DEXCOM G6 TRANSMITTER) misc, 1 each Every 3 (Three) Months., Disp: 1 each, Rfl: 3  •  glucose blood (ACCU-CHEK LÁZARO PLUS) test strip, Use as instructed to check blood sugar 4-6 times daily, Disp: 200 each, Rfl: 12  •  insulin lispro (HumaLOG) 100 UNIT/ML injection, Take up to 100u daily via insulin pump as directed, Disp: 90 mL, Rfl: 1  •  ONE TOUCH LANCETS misc, Test bs 4-8x/day, Disp: 750 each, Rfl: 3    Review of Systems  Review of Systems   Constitutional: Negative for chills, fatigue, fever  "and unexpected weight change.   HENT: Negative for ear pain, hearing loss, nosebleeds, rhinorrhea and sore throat.    Eyes: Negative for pain, discharge, redness, itching and visual disturbance.   Respiratory: Negative for cough, shortness of breath and wheezing.    Cardiovascular: Negative for chest pain, palpitations and leg swelling.   Gastrointestinal: Negative for abdominal pain, blood in stool, constipation and diarrhea.   Endocrine: Negative for cold intolerance, heat intolerance and polydipsia.   Genitourinary: Negative for dysuria, frequency, hematuria, pelvic pain, vaginal bleeding and vaginal discharge.   Musculoskeletal: Negative for arthralgias, gait problem, joint swelling and myalgias.   Skin: Negative for rash.   Allergic/Immunologic: Negative.    Neurological: Negative for dizziness, syncope, weakness, numbness and headaches.   Hematological: Negative for adenopathy. Does not bruise/bleed easily.   Psychiatric/Behavioral: Negative for sleep disturbance and suicidal ideas. The patient is not nervous/anxious.         Physical Exam    /58   Pulse 69   Ht 167.6 cm (66\")   Wt 67.9 kg (149 lb 9.6 oz)   SpO2 97%   BMI 24.15 kg/m² Body mass index is 24.15 kg/m².  Physical Exam   Constitutional: She is oriented to person, place, and time. She appears well-developed. No distress.   HENT:   Head: Normocephalic.   Right Ear: External ear normal.   Left Ear: External ear normal.   Mouth/Throat: No oropharyngeal exudate.   Eyes: Conjunctivae and lids are normal. Right eye exhibits no discharge. Left eye exhibits no discharge. Right pupil is reactive. Left pupil is reactive.   Neck: No JVD present. No tracheal deviation present. No thyroid mass and no thyromegaly present.   Cardiovascular: Normal rate, regular rhythm and normal heart sounds.   No murmur heard.  Pulmonary/Chest: Effort normal and breath sounds normal. No respiratory distress. She has no wheezes.   Abdominal: Soft. Bowel sounds are " normal. There is no abdominal tenderness.   Musculoskeletal: No tenderness.   Lymphadenopathy:     She has no cervical adenopathy.   Neurological: She is alert and oriented to person, place, and time.   Skin: Skin is warm and dry. No rash noted. She is not diaphoretic. No erythema.   Psychiatric: Her speech is normal and behavior is normal. Thought content normal.       Labs and Imaging   Lab Results   Component Value Date    HGBA1C 7.4 11/15/2021    HGBA1C 8.0 07/23/2021    HGBA1C 7.2 01/05/2021     Office Visit on 11/15/2021   Component Date Value Ref Range Status   • Hemoglobin A1C 11/15/2021 7.4  % Final   • Lot Number 11/15/2021 [id number]   Final   • Expiration Date 11/15/2021 06/28/2023   Final   • Glucose 11/15/2021 217* 70 - 130 mg/dL Final   • Lot Number 11/15/2021 [id number]   Final   • Expiration Date 11/15/2021 04/07/2022   Final     No images are attached to the encounter or orders placed in the encounter.  No Images in the past 120 days found..    Assessment / Plan   Diagnoses and all orders for this visit:    1. Uncontrolled type 1 diabetes mellitus with hyperglycemia (HCC) (Primary)  -     POC Glycosylated Hemoglobin (Hb A1C)  -     POC Glucose, Blood    2. Diabetic hypoglycemia (HCC)        Diabetes Mellitus 1 is under inadequate control.  -A1c 7.4   -discussed BG monitoring is inadequate, need to check AC and HS   -We were unable to download her tandem pump due to technical difficulties - this is the second time the pump could not be downloaded, she was asked to contact tandem customer service  -Encouraged resuming Dexcom use  -add all carbs and BG readings to pump 15 min before eating  -no pump setting changes  -current pump is in warranty until 5/8/2022. Will be eligible for control IQ at that time  -diabetes maintenance labs and foot exam updated 7/2021  -Eye exam is due and pt was encouraged again to schedule    1.  Diet: 3-4 carb servings per meal for females, 4-5 carb servings per meal for  males  Spread carb intake throughout the day  Increase lean protein and vegetable intake  Avoid sugary drinks and processed carbs including crackers, cookies, cakes  2.  Exercise: Recommend at least 30 minutes of exercise daily, at least 5 days per week. Increase exercise gradually.   3.  Blood Glucose Goal: Blood glucose goal <150 fasting, <180 2 hr postprandial  4.  Microalbumin due 7/2022  5.  Education performed during this visit: long term diabetic complications discussed. , annual eye examinations at Ophthalmology discussed, dental hygiene discussed  and foot care reviewed., home glucose monitoring emphasized, all medications, side effects and compliance discussed carefully and Hypoglycemia management and prevention reviewed. Reviewed ‘ABCs’ of diabetes management (respective goals in parentheses):  A1C (<7), blood pressure (<130/80), and cholesterol (LDL <100, if CVD <70).    There are no Patient Instructions on file for this visit.    Follow up: Return in about 4 months (around 3/15/2022).    Discussed the nature of the disease including, risks, complications, implications, management, safe and proper use of medications. Encouraged therapeutic lifestyle changes including low calorie diet with plenty of fruits and vegetables, daily exercise, medication compliance, and keeping scheduled follow up appointments with me and any other providers. Encouraged patient to have appointment for complete physical, fasting labs, appropriate screenings, and immunizations on an annual basis.        Tiffanie Rivero PA-C

## 2022-05-09 RX ORDER — INSULIN ASPART 100 [IU]/ML
INJECTION, SOLUTION INTRAVENOUS; SUBCUTANEOUS
Qty: 30 ML | Refills: 0 | Status: SHIPPED | OUTPATIENT
Start: 2022-05-09 | End: 2022-05-09 | Stop reason: SDUPTHER

## 2022-05-09 RX ORDER — INSULIN LISPRO 100 [IU]/ML
INJECTION, SOLUTION INTRAVENOUS; SUBCUTANEOUS
Qty: 30 ML | Refills: 0 | Status: SHIPPED | OUTPATIENT
Start: 2022-05-09 | End: 2022-05-09 | Stop reason: CLARIF

## 2022-05-09 RX ORDER — INSULIN ASPART 100 [IU]/ML
INJECTION, SOLUTION INTRAVENOUS; SUBCUTANEOUS
Qty: 30 ML | Refills: 0 | Status: SHIPPED | OUTPATIENT
Start: 2022-05-09

## 2022-07-01 ENCOUNTER — TELEPHONE (OUTPATIENT)
Dept: ENDOCRINOLOGY | Facility: CLINIC | Age: 22
End: 2022-07-01

## 2022-07-01 NOTE — TELEPHONE ENCOUNTER
[facility]LE[facility]INE WITH [facility] MEDICAL CALLING.  [phone]  EXT: [id number]  [facility] PATIENT ID: [id number]    STATES THAT IN ORDER TO FULFILL PRIOR AUTH THEY NEED THE LATEST OFFICE NOTES FOR THE PREVIOUS PATIENT VISIT - PROVIDED THAT PATIENT VISIT WAS PERFORMED WITHIN THE LAST 6 MONTHS.    WITHOUT FULL KNOWLEDGE OF WHAT I CAN OR CANNOT GIVE OUT, NO OFFICE ENCOUNTER NOTES WERE ROUTED.    PLEASE ADVISE

## 2022-07-05 NOTE — TELEPHONE ENCOUNTER
Received form. Last OV  Was 11/15/2021, has been over 6 months since last OV. NO future appt scheduled. Will fax form with this noted on it.

## 2022-07-11 RX ORDER — INSULIN LISPRO 100 [IU]/ML
INJECTION, SOLUTION INTRAVENOUS; SUBCUTANEOUS
Qty: 30 ML | Refills: 0 | Status: SHIPPED | OUTPATIENT
Start: 2022-07-11 | End: 2022-08-26 | Stop reason: SDUPTHER

## 2022-07-11 NOTE — TELEPHONE ENCOUNTER
LOV 11-15-21  Pt cx/ed 4-18-22 and did not reschedule    I LMOM to ask pt to call back to schedule an OV

## 2022-08-10 ENCOUNTER — TELEPHONE (OUTPATIENT)
Dept: ENDOCRINOLOGY | Facility: CLINIC | Age: 22
End: 2022-08-10

## 2022-08-10 DIAGNOSIS — E10.65 UNCONTROLLED TYPE 1 DIABETES MELLITUS WITH HYPERGLYCEMIA: Primary | ICD-10-CM

## 2022-08-10 RX ORDER — INSULIN LISPRO 100 [IU]/ML
INJECTION, SOLUTION INTRAVENOUS; SUBCUTANEOUS
Qty: 30 ML | Refills: 0 | OUTPATIENT
Start: 2022-08-10

## 2022-08-10 NOTE — TELEPHONE ENCOUNTER
I don't know of anyone in particular but did put in a referral with note to schedule in either [place] or [place].

## 2022-08-10 NOTE — TELEPHONE ENCOUNTER
Patient requested that Tiffanie can refer them to an endocrinologist in [facility] or [facility].

## 2022-08-26 DIAGNOSIS — E10.65 UNCONTROLLED TYPE 1 DIABETES MELLITUS WITH HYPERGLYCEMIA: Primary | ICD-10-CM

## 2022-08-26 NOTE — TELEPHONE ENCOUNTER
Pt called requesting a prescription for Humalog 100 unit/mL injection. Pt last f/u 11/15/21 no f/u appt scheduled

## 2022-08-27 RX ORDER — INSULIN LISPRO 100 [IU]/ML
INJECTION, SOLUTION INTRAVENOUS; SUBCUTANEOUS
Qty: 10 ML | Refills: 0 | Status: SHIPPED | OUTPATIENT
Start: 2022-08-27

## 2022-08-27 NOTE — TELEPHONE ENCOUNTER
Pt called on call line. Was almost out of insulin. She is now permanently in [place] and needs a refill on her insulin, but I notified her that under no circumstances will any additional refills be sent in without a visit. This has been noted on her scripts for months now. If she is unable to come to the office and needs a refill, she must at least schedule telemedicine visit until she can establish locally with a PCP/endo.

## 2022-08-29 RX ORDER — INSULIN LISPRO 100 [IU]/ML
INJECTION, SOLUTION INTRAVENOUS; SUBCUTANEOUS
Qty: 30 ML | Refills: 0 | OUTPATIENT
Start: 2022-08-29

## 2022-09-27 ENCOUNTER — TELEPHONE (OUTPATIENT)
Dept: ENDOCRINOLOGY | Facility: CLINIC | Age: 22
End: 2022-09-27

## 2022-09-27 NOTE — TELEPHONE ENCOUNTER
[facility] faxed over physician order and cgm order form. Patient has not been seen since November of 2021. Patient has no upcoming appointment. According to the previous telephone encounter patient has moved to [place] and is requesting a referral to a Endocrinology there. Faxed back the forms stating this.